# Patient Record
Sex: FEMALE | Race: BLACK OR AFRICAN AMERICAN | NOT HISPANIC OR LATINO | URBAN - METROPOLITAN AREA
[De-identification: names, ages, dates, MRNs, and addresses within clinical notes are randomized per-mention and may not be internally consistent; named-entity substitution may affect disease eponyms.]

---

## 2017-11-05 ENCOUNTER — EMERGENCY (EMERGENCY)
Facility: HOSPITAL | Age: 22
LOS: 1 days | Discharge: HOME | End: 2017-11-05
Attending: STUDENT IN AN ORGANIZED HEALTH CARE EDUCATION/TRAINING PROGRAM
Payer: COMMERCIAL

## 2017-11-05 VITALS
DIASTOLIC BLOOD PRESSURE: 64 MMHG | TEMPERATURE: 98 F | OXYGEN SATURATION: 98 % | WEIGHT: 145.06 LBS | SYSTOLIC BLOOD PRESSURE: 91 MMHG | HEIGHT: 67 IN | HEART RATE: 81 BPM

## 2017-11-05 PROCEDURE — 99284 EMERGENCY DEPT VISIT MOD MDM: CPT | Mod: 25

## 2017-11-05 NOTE — ED PROVIDER NOTE - OBJECTIVE STATEMENT
23 y/o female presents to the ED with c/o urinary symptoms. Pt reports urinary frequency, urgency, and pelvic pain. Pt denies fever, chills, and any other acute symptoms and complaints at this time.

## 2017-11-05 NOTE — ED ADULT TRIAGE NOTE - CHIEF COMPLAINT QUOTE
c/o pelvic and genitalia pain, frequent urination increased discharge,  unprotected intercourse about a month ago

## 2017-11-06 LAB
APPEARANCE UR: CLEAR — SIGNIFICANT CHANGE UP
BILIRUB UR-MCNC: NEGATIVE — SIGNIFICANT CHANGE UP
COLOR SPEC: YELLOW — SIGNIFICANT CHANGE UP
DIFF PNL FLD: ABNORMAL
EPI CELLS # UR: SIGNIFICANT CHANGE UP
GLUCOSE UR QL: NEGATIVE MG/DL — SIGNIFICANT CHANGE UP
HCG UR QL: NEGATIVE — SIGNIFICANT CHANGE UP
KETONES UR-MCNC: NEGATIVE — SIGNIFICANT CHANGE UP
LEUKOCYTE ESTERASE UR-ACNC: ABNORMAL
NITRITE UR-MCNC: NEGATIVE — SIGNIFICANT CHANGE UP
PH UR: 6.5 — SIGNIFICANT CHANGE UP (ref 5–8)
PROT UR-MCNC: NEGATIVE MG/DL — SIGNIFICANT CHANGE UP
RBC CASTS # UR COMP ASSIST: SIGNIFICANT CHANGE UP /HPF (ref 0–4)
SP GR SPEC: 1.01 — SIGNIFICANT CHANGE UP (ref 1.01–1.02)
UROBILINOGEN FLD QL: NEGATIVE MG/DL — SIGNIFICANT CHANGE UP
WBC UR QL: SIGNIFICANT CHANGE UP

## 2017-11-06 PROCEDURE — 99283 EMERGENCY DEPT VISIT LOW MDM: CPT

## 2017-11-06 PROCEDURE — 81001 URINALYSIS AUTO W/SCOPE: CPT

## 2017-11-06 PROCEDURE — 81025 URINE PREGNANCY TEST: CPT

## 2017-11-06 RX ORDER — PHENAZOPYRIDINE HCL 100 MG
1 TABLET ORAL
Qty: 6 | Refills: 0 | OUTPATIENT
Start: 2017-11-06 | End: 2017-11-08

## 2017-11-06 RX ORDER — NITROFURANTOIN MACROCRYSTAL 50 MG
1 CAPSULE ORAL
Qty: 20 | Refills: 0 | OUTPATIENT
Start: 2017-11-06 | End: 2017-11-16

## 2017-11-06 RX ORDER — NITROFURANTOIN MACROCRYSTAL 50 MG
100 CAPSULE ORAL ONCE
Qty: 0 | Refills: 0 | Status: COMPLETED | OUTPATIENT
Start: 2017-11-06 | End: 2017-11-06

## 2017-11-06 RX ORDER — METHENAMINE MANDELATE 1 G
1 TABLET ORAL
Qty: 20 | Refills: 0 | OUTPATIENT
Start: 2017-11-06 | End: 2017-11-16

## 2017-11-06 RX ADMIN — Medication 100 MILLIGRAM(S): at 01:04

## 2017-11-06 NOTE — ED ADULT NURSE NOTE - OBJECTIVE STATEMENT
patient states that she has discomfort in her pelvic region x a few weeks, states that when she urinates she has discomfort. patient denies nausea or vomiting or any radiation of pain

## 2017-11-29 ENCOUNTER — INPATIENT (INPATIENT)
Facility: HOSPITAL | Age: 22
LOS: 1 days | Discharge: HOME | DRG: 761 | End: 2017-12-01
Attending: STUDENT IN AN ORGANIZED HEALTH CARE EDUCATION/TRAINING PROGRAM | Admitting: STUDENT IN AN ORGANIZED HEALTH CARE EDUCATION/TRAINING PROGRAM
Payer: COMMERCIAL

## 2017-11-29 VITALS
WEIGHT: 145.06 LBS | HEART RATE: 72 BPM | TEMPERATURE: 98 F | SYSTOLIC BLOOD PRESSURE: 112 MMHG | RESPIRATION RATE: 18 BRPM | DIASTOLIC BLOOD PRESSURE: 69 MMHG | HEIGHT: 67 IN | OXYGEN SATURATION: 98 %

## 2017-11-29 DIAGNOSIS — R10.9 UNSPECIFIED ABDOMINAL PAIN: ICD-10-CM

## 2017-11-29 LAB
ALBUMIN SERPL ELPH-MCNC: 4.4 G/DL — SIGNIFICANT CHANGE UP (ref 3.3–5.2)
ALP SERPL-CCNC: 87 U/L — SIGNIFICANT CHANGE UP (ref 40–120)
ALT FLD-CCNC: 11 U/L — SIGNIFICANT CHANGE UP
ANION GAP SERPL CALC-SCNC: 13 MMOL/L — SIGNIFICANT CHANGE UP (ref 5–17)
APPEARANCE UR: ABNORMAL
APTT BLD: 31.4 SEC — SIGNIFICANT CHANGE UP (ref 27.5–37.4)
AST SERPL-CCNC: 23 U/L — SIGNIFICANT CHANGE UP
BACTERIA # UR AUTO: ABNORMAL
BASOPHILS # BLD AUTO: 0 K/UL — SIGNIFICANT CHANGE UP (ref 0–0.2)
BASOPHILS # BLD AUTO: 0 K/UL — SIGNIFICANT CHANGE UP (ref 0–0.2)
BASOPHILS NFR BLD AUTO: 0.1 % — SIGNIFICANT CHANGE UP (ref 0–2)
BILIRUB SERPL-MCNC: 0.5 MG/DL — SIGNIFICANT CHANGE UP (ref 0.4–2)
BILIRUB UR-MCNC: NEGATIVE — SIGNIFICANT CHANGE UP
BLD GP AB SCN SERPL QL: SIGNIFICANT CHANGE UP
BUN SERPL-MCNC: 6 MG/DL — LOW (ref 8–20)
CALCIUM SERPL-MCNC: 9.4 MG/DL — SIGNIFICANT CHANGE UP (ref 8.6–10.2)
CHLORIDE SERPL-SCNC: 103 MMOL/L — SIGNIFICANT CHANGE UP (ref 98–107)
CO2 SERPL-SCNC: 23 MMOL/L — SIGNIFICANT CHANGE UP (ref 22–29)
COLOR SPEC: YELLOW — SIGNIFICANT CHANGE UP
CREAT SERPL-MCNC: 0.55 MG/DL — SIGNIFICANT CHANGE UP (ref 0.5–1.3)
DIFF PNL FLD: ABNORMAL
EOSINOPHIL # BLD AUTO: 0 K/UL — SIGNIFICANT CHANGE UP (ref 0–0.5)
EOSINOPHIL # BLD AUTO: 0 K/UL — SIGNIFICANT CHANGE UP (ref 0–0.5)
EOSINOPHIL NFR BLD AUTO: 0.1 % — SIGNIFICANT CHANGE UP (ref 0–6)
EPI CELLS # UR: SIGNIFICANT CHANGE UP
GLUCOSE SERPL-MCNC: 100 MG/DL — SIGNIFICANT CHANGE UP (ref 70–115)
GLUCOSE UR QL: NEGATIVE MG/DL — SIGNIFICANT CHANGE UP
HCG SERPL-ACNC: <2 MIU/ML — SIGNIFICANT CHANGE UP
HCT VFR BLD CALC: 35.9 % — LOW (ref 37–47)
HCT VFR BLD CALC: 37.6 % — SIGNIFICANT CHANGE UP (ref 37–47)
HGB BLD-MCNC: 11.8 G/DL — LOW (ref 12–16)
HGB BLD-MCNC: 12.8 G/DL — SIGNIFICANT CHANGE UP (ref 12–16)
INR BLD: 1.12 RATIO — SIGNIFICANT CHANGE UP (ref 0.88–1.16)
KETONES UR-MCNC: ABNORMAL
LACTATE SERPL-SCNC: 0.8 MMOL/L — SIGNIFICANT CHANGE UP (ref 0.5–2)
LEUKOCYTE ESTERASE UR-ACNC: ABNORMAL
LIDOCAIN IGE QN: 24 U/L — SIGNIFICANT CHANGE UP (ref 22–51)
LYMPHOCYTES # BLD AUTO: 0.9 K/UL — LOW (ref 1–4.8)
LYMPHOCYTES # BLD AUTO: 1.6 K/UL — SIGNIFICANT CHANGE UP (ref 1–4.8)
LYMPHOCYTES # BLD AUTO: 13 % — LOW (ref 20–55)
LYMPHOCYTES # BLD AUTO: 8 % — LOW (ref 20–55)
MCHC RBC-ENTMCNC: 28.9 PG — SIGNIFICANT CHANGE UP (ref 27–31)
MCHC RBC-ENTMCNC: 29.8 PG — SIGNIFICANT CHANGE UP (ref 27–31)
MCHC RBC-ENTMCNC: 32.9 G/DL — SIGNIFICANT CHANGE UP (ref 32–36)
MCHC RBC-ENTMCNC: 34 G/DL — SIGNIFICANT CHANGE UP (ref 32–36)
MCV RBC AUTO: 87.6 FL — SIGNIFICANT CHANGE UP (ref 81–99)
MCV RBC AUTO: 88 FL — SIGNIFICANT CHANGE UP (ref 81–99)
MONOCYTES # BLD AUTO: 0.2 K/UL — SIGNIFICANT CHANGE UP (ref 0–0.8)
MONOCYTES # BLD AUTO: 0.7 K/UL — SIGNIFICANT CHANGE UP (ref 0–0.8)
MONOCYTES NFR BLD AUTO: 2 % — LOW (ref 3–10)
MONOCYTES NFR BLD AUTO: 5.9 % — SIGNIFICANT CHANGE UP (ref 3–10)
NEUTROPHILS # BLD AUTO: 10 K/UL — HIGH (ref 1.8–8)
NEUTROPHILS # BLD AUTO: 10.4 K/UL — HIGH (ref 1.8–8)
NEUTROPHILS NFR BLD AUTO: 80.8 % — HIGH (ref 37–73)
NEUTROPHILS NFR BLD AUTO: 88 % — HIGH (ref 37–73)
NEUTS BAND # BLD: 2 % — SIGNIFICANT CHANGE UP (ref 0–8)
NITRITE UR-MCNC: NEGATIVE — SIGNIFICANT CHANGE UP
PH UR: 6 — SIGNIFICANT CHANGE UP (ref 5–8)
PLAT MORPH BLD: NORMAL — SIGNIFICANT CHANGE UP
PLATELET # BLD AUTO: 305 K/UL — SIGNIFICANT CHANGE UP (ref 150–400)
PLATELET # BLD AUTO: 312 K/UL — SIGNIFICANT CHANGE UP (ref 150–400)
POTASSIUM SERPL-MCNC: 4.3 MMOL/L — SIGNIFICANT CHANGE UP (ref 3.5–5.3)
POTASSIUM SERPL-SCNC: 4.3 MMOL/L — SIGNIFICANT CHANGE UP (ref 3.5–5.3)
PROT SERPL-MCNC: 7.6 G/DL — SIGNIFICANT CHANGE UP (ref 6.6–8.7)
PROT UR-MCNC: 15 MG/DL
PROTHROM AB SERPL-ACNC: 12.3 SEC — SIGNIFICANT CHANGE UP (ref 9.8–12.7)
RBC # BLD: 4.08 M/UL — LOW (ref 4.4–5.2)
RBC # BLD: 4.29 M/UL — LOW (ref 4.4–5.2)
RBC # FLD: 12.8 % — SIGNIFICANT CHANGE UP (ref 11–15.6)
RBC # FLD: 13.4 % — SIGNIFICANT CHANGE UP (ref 11–15.6)
RBC BLD AUTO: NORMAL — SIGNIFICANT CHANGE UP
RBC CASTS # UR COMP ASSIST: ABNORMAL /HPF (ref 0–4)
SODIUM SERPL-SCNC: 139 MMOL/L — SIGNIFICANT CHANGE UP (ref 135–145)
SP GR SPEC: 1.01 — SIGNIFICANT CHANGE UP (ref 1.01–1.02)
TYPE + AB SCN PNL BLD: SIGNIFICANT CHANGE UP
UROBILINOGEN FLD QL: NEGATIVE MG/DL — SIGNIFICANT CHANGE UP
WBC # BLD: 11.5 K/UL — HIGH (ref 4.8–10.8)
WBC # BLD: 12.4 K/UL — HIGH (ref 4.8–10.8)
WBC # FLD AUTO: 11.5 K/UL — HIGH (ref 4.8–10.8)
WBC # FLD AUTO: 12.4 K/UL — HIGH (ref 4.8–10.8)
WBC UR QL: ABNORMAL

## 2017-11-29 PROCEDURE — 99223 1ST HOSP IP/OBS HIGH 75: CPT

## 2017-11-29 PROCEDURE — 74177 CT ABD & PELVIS W/CONTRAST: CPT | Mod: 26

## 2017-11-29 PROCEDURE — 76705 ECHO EXAM OF ABDOMEN: CPT | Mod: 26

## 2017-11-29 PROCEDURE — 76856 US EXAM PELVIC COMPLETE: CPT | Mod: 26

## 2017-11-29 PROCEDURE — 99285 EMERGENCY DEPT VISIT HI MDM: CPT | Mod: 25

## 2017-11-29 RX ORDER — SODIUM CHLORIDE 9 MG/ML
1000 INJECTION, SOLUTION INTRAVENOUS
Qty: 0 | Refills: 0 | Status: DISCONTINUED | OUTPATIENT
Start: 2017-11-29 | End: 2017-11-30

## 2017-11-29 RX ORDER — PANTOPRAZOLE SODIUM 20 MG/1
40 TABLET, DELAYED RELEASE ORAL ONCE
Qty: 0 | Refills: 0 | Status: COMPLETED | OUTPATIENT
Start: 2017-11-29 | End: 2017-11-29

## 2017-11-29 RX ORDER — SODIUM CHLORIDE 9 MG/ML
1000 INJECTION INTRAMUSCULAR; INTRAVENOUS; SUBCUTANEOUS
Qty: 0 | Refills: 0 | Status: DISCONTINUED | OUTPATIENT
Start: 2017-11-29 | End: 2017-11-30

## 2017-11-29 RX ORDER — SODIUM CHLORIDE 9 MG/ML
1000 INJECTION INTRAMUSCULAR; INTRAVENOUS; SUBCUTANEOUS ONCE
Qty: 0 | Refills: 0 | Status: COMPLETED | OUTPATIENT
Start: 2017-11-29 | End: 2017-11-29

## 2017-11-29 RX ORDER — METOCLOPRAMIDE HCL 10 MG
10 TABLET ORAL ONCE
Qty: 0 | Refills: 0 | Status: COMPLETED | OUTPATIENT
Start: 2017-11-29 | End: 2017-11-29

## 2017-11-29 RX ORDER — ONDANSETRON 8 MG/1
4 TABLET, FILM COATED ORAL ONCE
Qty: 0 | Refills: 0 | Status: COMPLETED | OUTPATIENT
Start: 2017-11-29 | End: 2017-11-29

## 2017-11-29 RX ORDER — FAMOTIDINE 10 MG/ML
20 INJECTION INTRAVENOUS ONCE
Qty: 0 | Refills: 0 | Status: COMPLETED | OUTPATIENT
Start: 2017-11-29 | End: 2017-11-29

## 2017-11-29 RX ORDER — DIPHENHYDRAMINE HCL 50 MG
25 CAPSULE ORAL ONCE
Qty: 0 | Refills: 0 | Status: COMPLETED | OUTPATIENT
Start: 2017-11-29 | End: 2017-11-29

## 2017-11-29 RX ORDER — SODIUM CHLORIDE 9 MG/ML
3 INJECTION INTRAMUSCULAR; INTRAVENOUS; SUBCUTANEOUS ONCE
Qty: 0 | Refills: 0 | Status: COMPLETED | OUTPATIENT
Start: 2017-11-29 | End: 2017-11-29

## 2017-11-29 RX ADMIN — FAMOTIDINE 20 MILLIGRAM(S): 10 INJECTION INTRAVENOUS at 07:50

## 2017-11-29 RX ADMIN — PANTOPRAZOLE SODIUM 40 MILLIGRAM(S): 20 TABLET, DELAYED RELEASE ORAL at 11:20

## 2017-11-29 RX ADMIN — SODIUM CHLORIDE 1000 MILLILITER(S): 9 INJECTION INTRAMUSCULAR; INTRAVENOUS; SUBCUTANEOUS at 07:49

## 2017-11-29 RX ADMIN — ONDANSETRON 4 MILLIGRAM(S): 8 TABLET, FILM COATED ORAL at 07:50

## 2017-11-29 RX ADMIN — SODIUM CHLORIDE 3 MILLILITER(S): 9 INJECTION INTRAMUSCULAR; INTRAVENOUS; SUBCUTANEOUS at 07:55

## 2017-11-29 RX ADMIN — Medication 25 MILLIGRAM(S): at 11:20

## 2017-11-29 RX ADMIN — Medication 10 MILLIGRAM(S): at 11:20

## 2017-11-29 RX ADMIN — SODIUM CHLORIDE 125 MILLILITER(S): 9 INJECTION INTRAMUSCULAR; INTRAVENOUS; SUBCUTANEOUS at 07:50

## 2017-11-29 RX ADMIN — SODIUM CHLORIDE 65 MILLILITER(S): 9 INJECTION, SOLUTION INTRAVENOUS at 16:35

## 2017-11-29 NOTE — H&P ADULT - ASSESSMENT
26 yo F with some SX consistent with early appendicitis which is also possible on CT.  However also noted to have corpus leuteal cyst which may be the etiology of pain given absence of fever and only mildly elevated WBC.  Can not RO Acute Appendicitis at this time.    1. Admit to surgery service  2. No Abx  3. No analgesics  4. NPO  5. Serial abd exams  6. Pelvic US to better eval ovaries and RO Ovarian torsion although very unlikely given gradual onset of pain.

## 2017-11-29 NOTE — ED ADULT NURSE REASSESSMENT NOTE - NS ED NURSE REASSESS COMMENT FT1
Report given and pt endorsed to receiving LISSETH HAYDEN at change of shift for follow up and continuity of care.

## 2017-11-29 NOTE — H&P ADULT - HISTORY OF PRESENT ILLNESS
24 yo F began having epigastric pain multiple episodes of NBNB vomiting from midnight last night.  Was already awake at that time.  Pain gradually became more severe throughout night which caused her to present.  + Annorrhexia.  Denies fever, chills, dysuria, vag bleeding or DC, similar SX in past or other CO.  Denies PMHX or P SX hx.  No recent travel or sick contacts or contacts with similar CO.  Can not identify exacerbating or remitting factors.

## 2017-11-29 NOTE — ED PROVIDER NOTE - OBJECTIVE STATEMENT
21 y/o female presents to the ED with c/o abdominal pain, onset last night. Pt reports n/v. Per EMS, pt had multiple syncopal events. Pt denies diarrhea, and any other acute symptoms and complaints at this time.   NKDA

## 2017-11-29 NOTE — ED ADULT NURSE REASSESSMENT NOTE - NS ED NURSE REASSESS COMMENT FT1
Bedside report recvd from off going RN, pt recvd lying on stretcher, A&Ox3, LR running via 20G R-AC at 65ml/hr as ordered, line patent and intact, pt tolerating well, pt reports dull abdominal pain, POC discussed with pt, awaiting bed assignment, call to be made to admitting team for further orders.  Pt verbalized understanding safety measures maintained.

## 2017-11-29 NOTE — H&P ADULT - GASTROINTESTINAL DETAILS
soft/no guarding/+ Mild-moderate periumbilical and BL Lower quadrant tenderness./no distention/no rebound tenderness/no rigidity/no organomegaly/bowel sounds normal

## 2017-11-29 NOTE — H&P ADULT - NSHPLABSRESULTS_GEN_ALL_CORE
Already had RUQ US which was read as WNL which prompted CT of Abd showing 6mm appendix with fluid filled tip.  No inflammatory changes.  + Subcentimeter nodes.  + Corpus leuteal cyst.

## 2017-11-29 NOTE — ED ADULT NURSE REASSESSMENT NOTE - NS ED NURSE REASSESS COMMENT FT1
s/w surgery order for repeat CBC, no analgesics at this time, explanation given to pt and family who verbalize understanding.  Order initiated.

## 2017-11-29 NOTE — ED ADULT NURSE NOTE - OBJECTIVE STATEMENT
22 year old female in no acute distress, alert and oriented x 4, BIBA for N/V and epigastric pain onset at midnight this morning. Pt denies diarrhea, denies fever, denies sick contacts. Pt medicated and labs drawn as ordered, fall precautions in place, will monitor. Grandmother in room with patient.

## 2017-11-29 NOTE — H&P ADULT - ATTENDING COMMENTS
avss  +n/v 1 day with general abd pain, non localized.  abd: soft mild ttp, epigastrium, non peritoneal.    plan  u/s pelvis  will admit for serial abd exams to see if evolves or resolves.  no abx, no pain meds .

## 2017-11-29 NOTE — ED ADULT NURSE REASSESSMENT NOTE - NS ED NURSE REASSESS COMMENT FT1
Pt reports she did void but forgot a sample was needed. Pt requesting pain medication and reports nausea. Pt with no vomiting visualized since arrival. Will advise Dr. Rueda.

## 2017-11-29 NOTE — ED ADULT NURSE NOTE - GENITOURINARY WDL
Bladder non-tender and non-distended. Urine not visualized. Pt with sterile cup and wipes verbalized understanding of clean catch procedure.

## 2017-11-30 LAB
ANION GAP SERPL CALC-SCNC: 12 MMOL/L — SIGNIFICANT CHANGE UP (ref 5–17)
BASOPHILS # BLD AUTO: 0 K/UL — SIGNIFICANT CHANGE UP (ref 0–0.2)
BASOPHILS NFR BLD AUTO: 0.2 % — SIGNIFICANT CHANGE UP (ref 0–2)
BUN SERPL-MCNC: 6 MG/DL — LOW (ref 8–20)
CALCIUM SERPL-MCNC: 8.3 MG/DL — LOW (ref 8.6–10.2)
CHLORIDE SERPL-SCNC: 104 MMOL/L — SIGNIFICANT CHANGE UP (ref 98–107)
CO2 SERPL-SCNC: 20 MMOL/L — LOW (ref 22–29)
CREAT SERPL-MCNC: 0.54 MG/DL — SIGNIFICANT CHANGE UP (ref 0.5–1.3)
EOSINOPHIL # BLD AUTO: 0.1 K/UL — SIGNIFICANT CHANGE UP (ref 0–0.5)
EOSINOPHIL NFR BLD AUTO: 0.6 % — SIGNIFICANT CHANGE UP (ref 0–6)
GLUCOSE BLDC GLUCOMTR-MCNC: 52 MG/DL — LOW (ref 70–99)
GLUCOSE SERPL-MCNC: 63 MG/DL — LOW (ref 70–115)
HCT VFR BLD CALC: 34.5 % — LOW (ref 37–47)
HGB BLD-MCNC: 11.6 G/DL — LOW (ref 12–16)
LYMPHOCYTES # BLD AUTO: 2.6 K/UL — SIGNIFICANT CHANGE UP (ref 1–4.8)
LYMPHOCYTES # BLD AUTO: 24.7 % — SIGNIFICANT CHANGE UP (ref 20–55)
MAGNESIUM SERPL-MCNC: 1.6 MG/DL — SIGNIFICANT CHANGE UP (ref 1.6–2.6)
MCHC RBC-ENTMCNC: 29.5 PG — SIGNIFICANT CHANGE UP (ref 27–31)
MCHC RBC-ENTMCNC: 33.6 G/DL — SIGNIFICANT CHANGE UP (ref 32–36)
MCV RBC AUTO: 87.8 FL — SIGNIFICANT CHANGE UP (ref 81–99)
MONOCYTES # BLD AUTO: 0.8 K/UL — SIGNIFICANT CHANGE UP (ref 0–0.8)
MONOCYTES NFR BLD AUTO: 7.4 % — SIGNIFICANT CHANGE UP (ref 3–10)
NEUTROPHILS # BLD AUTO: 7 K/UL — SIGNIFICANT CHANGE UP (ref 1.8–8)
NEUTROPHILS NFR BLD AUTO: 66.9 % — SIGNIFICANT CHANGE UP (ref 37–73)
PHOSPHATE SERPL-MCNC: 3 MG/DL — SIGNIFICANT CHANGE UP (ref 2.4–4.7)
PLATELET # BLD AUTO: 273 K/UL — SIGNIFICANT CHANGE UP (ref 150–400)
POTASSIUM SERPL-MCNC: 3.6 MMOL/L — SIGNIFICANT CHANGE UP (ref 3.5–5.3)
POTASSIUM SERPL-SCNC: 3.6 MMOL/L — SIGNIFICANT CHANGE UP (ref 3.5–5.3)
RBC # BLD: 3.93 M/UL — LOW (ref 4.4–5.2)
RBC # FLD: 13.3 % — SIGNIFICANT CHANGE UP (ref 11–15.6)
SODIUM SERPL-SCNC: 136 MMOL/L — SIGNIFICANT CHANGE UP (ref 135–145)
WBC # BLD: 10.5 K/UL — SIGNIFICANT CHANGE UP (ref 4.8–10.8)
WBC # FLD AUTO: 10.5 K/UL — SIGNIFICANT CHANGE UP (ref 4.8–10.8)

## 2017-11-30 PROCEDURE — 99233 SBSQ HOSP IP/OBS HIGH 50: CPT

## 2017-11-30 RX ORDER — POTASSIUM CHLORIDE 20 MEQ
10 PACKET (EA) ORAL ONCE
Qty: 0 | Refills: 0 | Status: COMPLETED | OUTPATIENT
Start: 2017-11-30 | End: 2017-11-30

## 2017-11-30 RX ORDER — MAGNESIUM SULFATE 500 MG/ML
2 VIAL (ML) INJECTION ONCE
Qty: 0 | Refills: 0 | Status: DISCONTINUED | OUTPATIENT
Start: 2017-11-30 | End: 2017-11-30

## 2017-11-30 RX ORDER — POTASSIUM CHLORIDE 20 MEQ
20 PACKET (EA) ORAL ONCE
Qty: 0 | Refills: 0 | Status: DISCONTINUED | OUTPATIENT
Start: 2017-11-30 | End: 2017-11-30

## 2017-11-30 RX ORDER — INFLUENZA VIRUS VACCINE 15; 15; 15; 15 UG/.5ML; UG/.5ML; UG/.5ML; UG/.5ML
0.5 SUSPENSION INTRAMUSCULAR ONCE
Qty: 0 | Refills: 0 | Status: DISCONTINUED | OUTPATIENT
Start: 2017-11-30 | End: 2017-12-01

## 2017-11-30 RX ORDER — MAGNESIUM SULFATE 500 MG/ML
2 VIAL (ML) INJECTION ONCE
Qty: 0 | Refills: 0 | Status: COMPLETED | OUTPATIENT
Start: 2017-11-30 | End: 2017-11-30

## 2017-11-30 RX ADMIN — SODIUM CHLORIDE 65 MILLILITER(S): 9 INJECTION, SOLUTION INTRAVENOUS at 03:19

## 2017-11-30 RX ADMIN — Medication 50 GRAM(S): at 16:00

## 2017-11-30 RX ADMIN — Medication 100 MILLIEQUIVALENT(S): at 17:38

## 2017-11-30 NOTE — ED ADULT NURSE REASSESSMENT NOTE - NS ED NURSE REASSESS COMMENT FT1
Report given to accepting RN, pt informed of transport to unit, verbalized understanding, awaiting transport team member.

## 2017-11-30 NOTE — PROGRESS NOTE PEDS - ASSESSMENT
26 yo F with likely corpus leuteal cyst  in the presence of bilateral TTP absence of fever, only mildly elevated WBC, and improving clinically.  -Continue with no Abx  -Advance diet to Regular diet, f/u with toleration of diet  -Repeat Fingerstick with hypoglycemic episode.  -D/c IVF  -Will likely d/c 12/1

## 2017-11-30 NOTE — PROGRESS NOTE PEDS - SUBJECTIVE AND OBJECTIVE BOX
INTERVAL HPI/OVERNIGHT EVENTS: Patient with episode of hypoglycemia.     SUBJECTIVE: Improved pain today, rated 5/10 and located in the inferior abdomen bilaterally, improve from 10/10 yesterday. Patient NPO with IVF. One episode of dizziness when she got up to go to the bathroom, laid down on the floor. Finger stick 52, and given juice. Denies +Urination, denies flatus, denies BM. Denies F/C/N/V/CP/SOB.       MEDICATIONS  (STANDING):  influenza   Vaccine 0.5 milliLiter(s) IntraMuscular once  magnesium sulfate  IVPB 2 Gram(s) IV Intermittent once  potassium chloride  10 mEq/100 mL IVPB 10 milliEquivalent(s) IV Intermittent once    MEDICATIONS  (PRN):      Vital Signs Last 24 Hrs  T(C): 37 (2017 07:57), Max: 37.3 (2017 02:48)  T(F): 98.6 (2017 07:57), Max: 99.1 (2017 02:48)  HR: 77 (2017 07:57) (63 - 88)  BP: 99/62 (2017 07:57) (96/65 - 105/74)  BP(mean): --  RR: 16 (2017 07:57) (16 - 20)  SpO2: 100% (2017 07:57) (95% - 100%)    PE  Gen: AAO x3, NAD  Pulm: CTAB, Symmetrical chest rise  CV: RRR  Abd: Soft, Mild RLQ, LLQ tenderness to palpation, ND, -R/-G  Ext: No C/C/E  Vasc: 2+ Radial, DP pulses  Neuro: No focal neurological deficits      I&O's Detail    2017 07:01  -  2017 07:00  --------------------------------------------------------  IN:    lactated ringers.: 260 mL  Total IN: 260 mL    OUT:  Total OUT: 0 mL    Total NET: 260 mL          LABS:                        11.6   10.5  )-----------( 273      ( 2017 07:44 )             34.5     11-30    136  |  104  |  6.0<L>  ----------------------------<  63<L>  3.6   |  20.0<L>  |  0.54    Ca    8.3<L>      2017 07:44  Phos  3.0       Mg     1.6         TPro  7.6  /  Alb  4.4  /  TBili  0.5  /  DBili  x   /  AST  23  /  ALT  11  /  AlkPhos  87      PT/INR - ( 2017 14:50 )   PT: 12.3 sec;   INR: 1.12 ratio         PTT - ( 2017 14:50 )  PTT:31.4 sec  Urinalysis Basic - ( 2017 18:40 )    Color: Yellow / Appearance: Slightly Turbid / S.015 / pH: x  Gluc: x / Ketone: Moderate  / Bili: Negative / Urobili: Negative mg/dL   Blood: x / Protein: 15 mg/dL / Nitrite: Negative   Leuk Esterase: Moderate / RBC: 3-5 /HPF / WBC 6-10   Sq Epi: x / Non Sq Epi: Few / Bacteria: Few

## 2017-12-01 ENCOUNTER — TRANSCRIPTION ENCOUNTER (OUTPATIENT)
Age: 22
End: 2017-12-01

## 2017-12-01 VITALS
OXYGEN SATURATION: 100 % | RESPIRATION RATE: 16 BRPM | HEART RATE: 72 BPM | SYSTOLIC BLOOD PRESSURE: 108 MMHG | TEMPERATURE: 99 F | DIASTOLIC BLOOD PRESSURE: 70 MMHG

## 2017-12-01 LAB
BASOPHILS # BLD AUTO: 0 K/UL — SIGNIFICANT CHANGE UP (ref 0–0.2)
BASOPHILS NFR BLD AUTO: 0.2 % — SIGNIFICANT CHANGE UP (ref 0–2)
EOSINOPHIL # BLD AUTO: 0.1 K/UL — SIGNIFICANT CHANGE UP (ref 0–0.5)
EOSINOPHIL NFR BLD AUTO: 1.4 % — SIGNIFICANT CHANGE UP (ref 0–6)
HCT VFR BLD CALC: 34.7 % — LOW (ref 37–47)
HGB BLD-MCNC: 11.7 G/DL — LOW (ref 12–16)
LYMPHOCYTES # BLD AUTO: 1.8 K/UL — SIGNIFICANT CHANGE UP (ref 1–4.8)
LYMPHOCYTES # BLD AUTO: 20.9 % — SIGNIFICANT CHANGE UP (ref 20–55)
MCHC RBC-ENTMCNC: 29.3 PG — SIGNIFICANT CHANGE UP (ref 27–31)
MCHC RBC-ENTMCNC: 33.7 G/DL — SIGNIFICANT CHANGE UP (ref 32–36)
MCV RBC AUTO: 87 FL — SIGNIFICANT CHANGE UP (ref 81–99)
MONOCYTES # BLD AUTO: 0.8 K/UL — SIGNIFICANT CHANGE UP (ref 0–0.8)
MONOCYTES NFR BLD AUTO: 9.5 % — SIGNIFICANT CHANGE UP (ref 3–10)
NEUTROPHILS # BLD AUTO: 5.7 K/UL — SIGNIFICANT CHANGE UP (ref 1.8–8)
NEUTROPHILS NFR BLD AUTO: 67.8 % — SIGNIFICANT CHANGE UP (ref 37–73)
PLATELET # BLD AUTO: 299 K/UL — SIGNIFICANT CHANGE UP (ref 150–400)
RBC # BLD: 3.99 M/UL — LOW (ref 4.4–5.2)
RBC # FLD: 13.3 % — SIGNIFICANT CHANGE UP (ref 11–15.6)
WBC # BLD: 8.4 K/UL — SIGNIFICANT CHANGE UP (ref 4.8–10.8)
WBC # FLD AUTO: 8.4 K/UL — SIGNIFICANT CHANGE UP (ref 4.8–10.8)

## 2017-12-01 PROCEDURE — 86900 BLOOD TYPING SEROLOGIC ABO: CPT

## 2017-12-01 PROCEDURE — 83735 ASSAY OF MAGNESIUM: CPT

## 2017-12-01 PROCEDURE — 81001 URINALYSIS AUTO W/SCOPE: CPT

## 2017-12-01 PROCEDURE — 83605 ASSAY OF LACTIC ACID: CPT

## 2017-12-01 PROCEDURE — 76856 US EXAM PELVIC COMPLETE: CPT

## 2017-12-01 PROCEDURE — 99233 SBSQ HOSP IP/OBS HIGH 50: CPT

## 2017-12-01 PROCEDURE — 86850 RBC ANTIBODY SCREEN: CPT

## 2017-12-01 PROCEDURE — 84100 ASSAY OF PHOSPHORUS: CPT

## 2017-12-01 PROCEDURE — 85730 THROMBOPLASTIN TIME PARTIAL: CPT

## 2017-12-01 PROCEDURE — 85027 COMPLETE CBC AUTOMATED: CPT

## 2017-12-01 PROCEDURE — 86901 BLOOD TYPING SEROLOGIC RH(D): CPT

## 2017-12-01 PROCEDURE — 84702 CHORIONIC GONADOTROPIN TEST: CPT

## 2017-12-01 PROCEDURE — 36415 COLL VENOUS BLD VENIPUNCTURE: CPT

## 2017-12-01 PROCEDURE — 80048 BASIC METABOLIC PNL TOTAL CA: CPT

## 2017-12-01 PROCEDURE — 74177 CT ABD & PELVIS W/CONTRAST: CPT

## 2017-12-01 PROCEDURE — 85610 PROTHROMBIN TIME: CPT

## 2017-12-01 PROCEDURE — 99285 EMERGENCY DEPT VISIT HI MDM: CPT | Mod: 25

## 2017-12-01 PROCEDURE — 80053 COMPREHEN METABOLIC PANEL: CPT

## 2017-12-01 PROCEDURE — 96375 TX/PRO/DX INJ NEW DRUG ADDON: CPT

## 2017-12-01 PROCEDURE — 76705 ECHO EXAM OF ABDOMEN: CPT

## 2017-12-01 PROCEDURE — 83690 ASSAY OF LIPASE: CPT

## 2017-12-01 PROCEDURE — 82962 GLUCOSE BLOOD TEST: CPT

## 2017-12-01 PROCEDURE — 96374 THER/PROPH/DIAG INJ IV PUSH: CPT | Mod: XU

## 2017-12-01 NOTE — DISCHARGE NOTE ADULT - CARE PLAN
Principal Discharge DX:	Generalized abdominal pain  Goal:	alleviation of pain and symptoms  Instructions for follow-up, activity and diet:	Resume regular diet as tolerated. Follow up with OB GYN as outpatient regarding possible cyst. Follow up with [facility] clinic on thursday of next week for further follow up.  Return to ER if recurrent symptoms that prevent ability to perform ADLs (activities of daily living). Diet as tolerated. Tylenol/Motrin for pain control.

## 2017-12-01 NOTE — DISCHARGE NOTE ADULT - HOSPITAL COURSE
HPI:  26 yo F began having epigastric pain multiple episodes of NBNB vomiting from midnight last night.  Was already awake at that time.  Pain gradually became more severe throughout night which caused her to present.  + Annorrhexia.  Denies fever, chills, dysuria, vag bleeding or DC, similar SX in past or other CO.  Denies PMHX or P SX hx.  No recent travel or sick contacts or contacts with similar CO.  Can not identify exacerbating or remitting factors. (29 Nov 2017 13:41)    Hospital Course:   Patient was admitted for pain control and diagnostic workup. Etiology of abdominal pain not thought to be due to appendicitis. More likely pain due to ovarian cyst. Patient's diet was advanced on 11/30. Pain is controlled. Patient ambulating. Stable for discharge. Patient should follow up with ACS, OB GYN and Primary care practitioner upon discharge from hospital.

## 2017-12-01 NOTE — DISCHARGE NOTE ADULT - PROVIDER TOKENS
FREE:[LAST:[OB GYN/ PMD],PHONE:[(   )    -],FAX:[(   )    -],ADDRESS:[Follow with OB GYN as outpatient as well as PMD]],FREE:[LAST:[[facility]],PHONE:[phone]],FAX:[(   )    -],ADDRESS:[[address] [place] [address], [address] [place] [address]]]

## 2017-12-01 NOTE — DISCHARGE NOTE ADULT - OTHER SIGNIFICANT FINDINGS
CT Abdomen: Small amount of fluid around tip of appendix. Doubt appendicitis    Abdominal US: Right ovarian cyst

## 2017-12-01 NOTE — DISCHARGE NOTE ADULT - CARE PROVIDER_API CALL
OB GYN/ PMD,   Follow with OB GYN as outpatient as well as PMD  Phone: (   )    -  Fax: (   )    -    [facility],   [address] [facility], [address] [place] [address]  Phone: [phone]  Fax: (   )    -

## 2017-12-01 NOTE — DISCHARGE NOTE ADULT - PATIENT PORTAL LINK FT
“You can access the Follow[employer] Patient Portal, offered by [facility], by registering with the following website: [email or web address]”

## 2018-01-28 ENCOUNTER — EMERGENCY (EMERGENCY)
Facility: HOSPITAL | Age: 23
LOS: 1 days | Discharge: HOME | End: 2018-01-28
Attending: EMERGENCY MEDICINE | Admitting: EMERGENCY MEDICINE
Payer: COMMERCIAL

## 2018-01-28 VITALS
DIASTOLIC BLOOD PRESSURE: 75 MMHG | OXYGEN SATURATION: 98 % | TEMPERATURE: 99 F | WEIGHT: 139.99 LBS | RESPIRATION RATE: 20 BRPM | SYSTOLIC BLOOD PRESSURE: 109 MMHG | HEIGHT: 67 IN | HEART RATE: 120 BPM

## 2018-01-28 VITALS
RESPIRATION RATE: 16 BRPM | SYSTOLIC BLOOD PRESSURE: 101 MMHG | DIASTOLIC BLOOD PRESSURE: 67 MMHG | OXYGEN SATURATION: 100 % | TEMPERATURE: 99 F | HEART RATE: 84 BPM

## 2018-01-28 LAB
RAPID RVP RESULT: SIGNIFICANT CHANGE UP
S PYO AG SPEC QL IA: NEGATIVE — SIGNIFICANT CHANGE UP

## 2018-01-28 PROCEDURE — 81001 URINALYSIS AUTO W/SCOPE: CPT

## 2018-01-28 PROCEDURE — 71046 X-RAY EXAM CHEST 2 VIEWS: CPT

## 2018-01-28 PROCEDURE — 99284 EMERGENCY DEPT VISIT MOD MDM: CPT | Mod: 25

## 2018-01-28 PROCEDURE — 86308 HETEROPHILE ANTIBODY SCREEN: CPT

## 2018-01-28 PROCEDURE — 96374 THER/PROPH/DIAG INJ IV PUSH: CPT

## 2018-01-28 PROCEDURE — 85027 COMPLETE CBC AUTOMATED: CPT

## 2018-01-28 PROCEDURE — 99284 EMERGENCY DEPT VISIT MOD MDM: CPT

## 2018-01-28 PROCEDURE — 87081 CULTURE SCREEN ONLY: CPT

## 2018-01-28 PROCEDURE — 87581 M.PNEUMON DNA AMP PROBE: CPT

## 2018-01-28 PROCEDURE — 87633 RESP VIRUS 12-25 TARGETS: CPT

## 2018-01-28 PROCEDURE — 84702 CHORIONIC GONADOTROPIN TEST: CPT

## 2018-01-28 PROCEDURE — 83690 ASSAY OF LIPASE: CPT

## 2018-01-28 PROCEDURE — 71046 X-RAY EXAM CHEST 2 VIEWS: CPT | Mod: 26

## 2018-01-28 PROCEDURE — 87798 DETECT AGENT NOS DNA AMP: CPT

## 2018-01-28 PROCEDURE — 36415 COLL VENOUS BLD VENIPUNCTURE: CPT

## 2018-01-28 PROCEDURE — 80053 COMPREHEN METABOLIC PANEL: CPT

## 2018-01-28 PROCEDURE — 87486 CHLMYD PNEUM DNA AMP PROBE: CPT

## 2018-01-28 PROCEDURE — 87880 STREP A ASSAY W/OPTIC: CPT

## 2018-01-28 PROCEDURE — 87086 URINE CULTURE/COLONY COUNT: CPT

## 2018-01-28 RX ORDER — CEPHALEXIN 500 MG
10 CAPSULE ORAL
Qty: 200 | Refills: 0 | OUTPATIENT
Start: 2018-01-28 | End: 2018-02-06

## 2018-01-28 RX ORDER — SODIUM CHLORIDE 9 MG/ML
10001 INJECTION INTRAMUSCULAR; INTRAVENOUS; SUBCUTANEOUS ONCE
Qty: 0 | Refills: 0 | Status: DISCONTINUED | OUTPATIENT
Start: 2018-01-28 | End: 2018-01-28

## 2018-01-28 RX ORDER — ONDANSETRON 8 MG/1
4 TABLET, FILM COATED ORAL ONCE
Qty: 0 | Refills: 0 | Status: COMPLETED | OUTPATIENT
Start: 2018-01-28 | End: 2018-01-28

## 2018-01-28 RX ORDER — SODIUM CHLORIDE 9 MG/ML
1000 INJECTION INTRAMUSCULAR; INTRAVENOUS; SUBCUTANEOUS ONCE
Qty: 0 | Refills: 0 | Status: COMPLETED | OUTPATIENT
Start: 2018-01-28 | End: 2018-01-28

## 2018-01-28 RX ORDER — CEPHALEXIN 500 MG
500 CAPSULE ORAL EVERY 6 HOURS
Qty: 0 | Refills: 0 | Status: DISCONTINUED | OUTPATIENT
Start: 2018-01-28 | End: 2018-02-01

## 2018-01-28 RX ORDER — DEXAMETHASONE 0.5 MG/5ML
4 ELIXIR ORAL ONCE
Qty: 0 | Refills: 0 | Status: COMPLETED | OUTPATIENT
Start: 2018-01-28 | End: 2018-01-28

## 2018-01-28 RX ADMIN — SODIUM CHLORIDE 1000 MILLILITER(S): 9 INJECTION INTRAMUSCULAR; INTRAVENOUS; SUBCUTANEOUS at 12:43

## 2018-01-28 RX ADMIN — Medication 4 MILLIGRAM(S): at 12:43

## 2018-01-28 RX ADMIN — Medication 500 MILLIGRAM(S): at 15:13

## 2018-01-28 NOTE — ED STATDOCS - OBJECTIVE STATEMENT
21 y/o pt with no pertinent past medical hx, presents to ED c/o sore throat ( onset yesterday), with a cough. Pt states it is hard for her to swallow. She is also c/o cramping abd pain (onset a few days ago) with nausea. She describes her abd pain as a 7/10. Additionally pt adds that she has dysuria and frequent urination as well. She recently had a Ct and US of the pelvis which showed a small right ovarian cyst. Her last bowl movement was yesterday. Pt is able to pass gas normally. LMP was in late December 2018. Denies recent travel. Denies having children. Denies being a smoker. Denies vomiting, diarrhea, CP and SOB. No further complaints at this time.

## 2018-01-28 NOTE — ED STATDOCS - CARE PLAN
Principal Discharge DX:	Urinary tract infection without hematuria, site unspecified  Secondary Diagnosis:	Pharyngitis, unspecified etiology  Secondary Diagnosis:	Bronchitis

## 2018-01-28 NOTE — ED STATDOCS - PROGRESS NOTE DETAILS
< type="items" fid="[id number]  pt feels much better and will be discharged

## 2018-01-28 NOTE — ED ADULT NURSE NOTE - OBJECTIVE STATEMENT
Assumed pt care at 1200.  Pt awake, alert and oriented x3 c/o upper abd pain, non productive cough and sore throat.  Last Bm yesterday, normal.  LMP end of december.  Pt also states burning upon urination and increased urinary frequency.  denies n/v/d or body aches.

## 2018-01-28 NOTE — ED STATDOCS - MEDICAL DECISION MAKING DETAILS
Pt with persistent cough possible pneumonia vs bronchitis. Abd pain gastritis vs gastroenteritis. Pt had recent ct abd pelvis and us which showed a small right ovarian cyst. will hydrate and give Zofran

## 2018-01-28 NOTE — ED ADULT TRIAGE NOTE - CHIEF COMPLAINT QUOTE
abd pain, no vomit, no diarrhea, sore throat, cough non productive. sinus pressure. abd pain x 2 days.

## 2018-01-30 LAB
CULTURE RESULTS: SIGNIFICANT CHANGE UP
SPECIMEN SOURCE: SIGNIFICANT CHANGE UP

## 2018-01-31 LAB
CULTURE RESULTS: SIGNIFICANT CHANGE UP
SPECIMEN SOURCE: SIGNIFICANT CHANGE UP

## 2018-05-16 ENCOUNTER — EMERGENCY (EMERGENCY)
Facility: HOSPITAL | Age: 23
LOS: 1 days | Discharge: HOME | End: 2018-05-16
Attending: EMERGENCY MEDICINE
Payer: COMMERCIAL

## 2018-05-16 VITALS
HEART RATE: 67 BPM | HEIGHT: 67 IN | OXYGEN SATURATION: 100 % | RESPIRATION RATE: 17 BRPM | DIASTOLIC BLOOD PRESSURE: 81 MMHG | TEMPERATURE: 98 F | WEIGHT: 139.99 LBS | SYSTOLIC BLOOD PRESSURE: 116 MMHG

## 2018-05-16 PROCEDURE — 99283 EMERGENCY DEPT VISIT LOW MDM: CPT

## 2018-05-16 PROCEDURE — 93010 ELECTROCARDIOGRAM REPORT: CPT

## 2018-05-16 PROCEDURE — 71045 X-RAY EXAM CHEST 1 VIEW: CPT

## 2018-05-16 PROCEDURE — 71045 X-RAY EXAM CHEST 1 VIEW: CPT | Mod: 26

## 2018-05-16 PROCEDURE — 93005 ELECTROCARDIOGRAM TRACING: CPT

## 2018-05-16 RX ORDER — IBUPROFEN 200 MG
4 TABLET ORAL
Qty: 112 | Refills: 0 | OUTPATIENT
Start: 2018-05-16 | End: 2018-05-22

## 2018-05-22 NOTE — ED PROVIDER NOTE - OBJECTIVE STATEMENT
21 yo female no pmh on no meds comes to ed with left sided chest and shoulder pain; pt denies any injury, fever chills, sick contacts or recent travel

## 2018-05-22 NOTE — ED PROVIDER NOTE - TOBACCO USE
How Severe Is It?: moderate Is This A New Presentation, Or A Follow-Up?: Rash Additional History: Patient states she was seen by Dr Mar a dermatologist in [place] who states she had staph and impetigo. Patient was treated with Keflex 500 mg four times a day for 7 days and betamethasone dipropionate cream. Patient states that the infection seemed to have cleared up with the antibiotic but the topical only helps with the itching. Never smoker

## 2018-05-29 ENCOUNTER — EMERGENCY (EMERGENCY)
Facility: HOSPITAL | Age: 23
LOS: 1 days | Discharge: HOME | End: 2018-05-29
Attending: EMERGENCY MEDICINE
Payer: COMMERCIAL

## 2018-05-29 VITALS
SYSTOLIC BLOOD PRESSURE: 110 MMHG | RESPIRATION RATE: 20 BRPM | HEART RATE: 89 BPM | TEMPERATURE: 98 F | DIASTOLIC BLOOD PRESSURE: 64 MMHG | OXYGEN SATURATION: 100 %

## 2018-05-29 VITALS — HEIGHT: 67 IN | WEIGHT: 139.99 LBS

## 2018-05-29 LAB — HCG UR QL: NEGATIVE — SIGNIFICANT CHANGE UP

## 2018-05-29 PROCEDURE — 81025 URINE PREGNANCY TEST: CPT

## 2018-05-29 PROCEDURE — 99283 EMERGENCY DEPT VISIT LOW MDM: CPT

## 2018-05-29 RX ORDER — FLUTICASONE PROPIONATE 50 MCG
1 SPRAY, SUSPENSION NASAL
Qty: 1 | Refills: 0 | OUTPATIENT
Start: 2018-05-29 | End: 2018-06-04

## 2018-05-29 RX ORDER — LORATADINE 10 MG/1
1 TABLET ORAL
Qty: 5 | Refills: 0 | OUTPATIENT
Start: 2018-05-29 | End: 2018-06-02

## 2018-05-29 NOTE — ED PROVIDER NOTE - OBJECTIVE STATEMENT
This is a 22 year old female with c/o cough x 1 week, associated with productive green plegm.  She notes has been taking Dayquil This is a 22 year old female with c/o cough x 1 week, associated with productive green phlegm.  She notes has been taking Dayquil with no relief.  She denies any fevers or chills.  Patient any h/o asthma or smoking.  She reports initially started off with sore throat and now subsided.  She denies any recent travel or rashes, SOB, n/v/d or any abdominal pain.

## 2018-05-29 NOTE — ED PROVIDER NOTE - ATTENDING CONTRIBUTION TO CARE
seen with acp: well appearing, NAD; normal oropharynx; clear lungs; benign exam; agree with acp plan of care.    I, Orion Mitchell, performed the initial face to face bedside interview with this patient regarding history of present illness, review of symptoms and relevant past medical, social and family history.  I completed an independent physical examination.  I was the initial provider who evaluated this patient. I have signed out the follow up of any pending tests (i.e. labs, radiological studies) to the ACP.  I have communicated the patient’s plan of care and disposition with the ACP.

## 2018-05-29 NOTE — ED ADULT NURSE NOTE - OBJECTIVE STATEMENT
pt came to the ED for c/o cough with green mucous for one week.  pt also stated that vomited to day once.  pt also c/o sore throat for a week.  pt is A/Ox3.

## 2018-07-09 ENCOUNTER — RESULT REVIEW (OUTPATIENT)
Age: 23
End: 2018-07-09

## 2018-07-13 ENCOUNTER — EMERGENCY (EMERGENCY)
Facility: HOSPITAL | Age: 23
LOS: 1 days | Discharge: HOME | End: 2018-07-13
Attending: EMERGENCY MEDICINE
Payer: COMMERCIAL

## 2018-07-13 VITALS — HEIGHT: 67 IN | WEIGHT: 139.99 LBS

## 2018-07-13 VITALS
SYSTOLIC BLOOD PRESSURE: 100 MMHG | DIASTOLIC BLOOD PRESSURE: 67 MMHG | RESPIRATION RATE: 18 BRPM | HEART RATE: 70 BPM | TEMPERATURE: 97 F | OXYGEN SATURATION: 100 %

## 2018-07-13 PROCEDURE — 73564 X-RAY EXAM KNEE 4 OR MORE: CPT | Mod: 26,LT

## 2018-07-13 PROCEDURE — 99283 EMERGENCY DEPT VISIT LOW MDM: CPT

## 2018-07-13 PROCEDURE — 73564 X-RAY EXAM KNEE 4 OR MORE: CPT

## 2018-07-13 RX ORDER — IBUPROFEN 200 MG
600 TABLET ORAL ONCE
Qty: 0 | Refills: 0 | Status: COMPLETED | OUTPATIENT
Start: 2018-07-13 | End: 2018-07-13

## 2018-07-13 RX ADMIN — Medication 600 MILLIGRAM(S): at 19:53

## 2018-07-13 NOTE — ED PROVIDER NOTE - PHYSICAL EXAMINATION
Const: Awake, alert and oriented. In no acute distress. Well appearing.  HEENT: NC/AT. Moist mucous membranes.  Eyes: No scleral icterus. EOMI.  Neck:. Soft and supple. Full ROM without pain.  Cardiac: Regular rate and regular rhythm. +S1/S2. No murmurs.   Resp: Speaking in full sentences. No evidence of respiratory distress. No wheezes, rales or rhonchi.  Abd: Soft, non-tender, non-distended. Normal bowel sounds in all 4 quadrants. No guarding or rebound.  MSK: Tenderness over the left knee, FULL ROM in all extremities, neurovasculary intact, DP palpable   Back: Spine midline and non-tender. No CVAT.  Skin: No rashes, abrasions or lacerations.  Lymph: No cervical lymphadenopathy.  Neuro: Awake, alert & oriented x 3. Moves all extremities symmetrically.

## 2018-07-13 NOTE — ED PROVIDER NOTE - OBJECTIVE STATEMENT
Patient is a 23 y/o female c/o of left knee pain x 2 days. Patient denies injuries or trauma to the left knee. Patient denies past history of knee pain and surgeries on the knee. Patient denies taking any medications for the pain. Patient denies difficulty ambulating on the knee. Patient denies fevers, abrasions or lacerations, numbness or loss of sensation.

## 2018-07-13 NOTE — ED PROVIDER NOTE - ATTENDING CONTRIBUTION TO CARE
I, Herman Silva, performed a face to face bedside interview with this patient regarding history of present illness, review of symptoms and relevant past medical, social and family history.  I completed an independent physical examination. I have communicated the patient’s plan of care and disposition with the ACP.  22 year year old female presents with 2 days of atraumatic knee pain  Gen: NAD, well appearing  CV: RRR  Pul: CTA b/l  Abd: Soft, non-distended, non-tender  Neuro: no focal deficits  msk: no joint laxity, no effusion, able to bear weight  Pt improved, bearing weight, stable for dc

## 2019-05-16 NOTE — ED ADULT TRIAGE NOTE - BMI (KG/M2)
21.9 Advancement-Rotation Flap Text: The defect edges were debeveled with a #15 scalpel blade.  Given the location of the defect, shape of the defect and the proximity to free margins an advancement-rotation flap was deemed most appropriate.  Using a sterile surgical marker, an appropriate flap was drawn incorporating the defect and placing the expected incisions within the relaxed skin tension lines where possible. The area thus outlined was incised deep to adipose tissue with a #15 scalpel blade.  The skin margins were undermined to an appropriate distance in all directions utilizing iris scissors.

## 2019-12-13 ENCOUNTER — RESULT REVIEW (OUTPATIENT)
Age: 24
End: 2019-12-13

## 2020-09-18 ENCOUNTER — ASOB RESULT (OUTPATIENT)
Age: 25
End: 2020-09-18

## 2020-09-18 ENCOUNTER — APPOINTMENT (OUTPATIENT)
Dept: ANTEPARTUM | Facility: CLINIC | Age: 25
End: 2020-09-18
Payer: COMMERCIAL

## 2020-09-18 PROCEDURE — 76818 FETAL BIOPHYS PROFILE W/NST: CPT

## 2020-09-21 ENCOUNTER — APPOINTMENT (OUTPATIENT)
Dept: ANTEPARTUM | Facility: CLINIC | Age: 25
End: 2020-09-21

## 2020-09-28 ENCOUNTER — OUTPATIENT (OUTPATIENT)
Dept: OUTPATIENT SERVICES | Facility: HOSPITAL | Age: 25
LOS: 1 days | End: 2020-09-28
Payer: COMMERCIAL

## 2020-09-28 VITALS — HEART RATE: 99 BPM | DIASTOLIC BLOOD PRESSURE: 71 MMHG | SYSTOLIC BLOOD PRESSURE: 111 MMHG

## 2020-09-28 VITALS — SYSTOLIC BLOOD PRESSURE: 105 MMHG | DIASTOLIC BLOOD PRESSURE: 72 MMHG | HEART RATE: 86 BPM

## 2020-09-28 DIAGNOSIS — O47.1 FALSE LABOR AT OR AFTER 37 COMPLETED WEEKS OF GESTATION: ICD-10-CM

## 2020-09-28 LAB
APPEARANCE UR: CLEAR — SIGNIFICANT CHANGE UP
BILIRUB UR-MCNC: NEGATIVE — SIGNIFICANT CHANGE UP
COLOR SPEC: YELLOW — SIGNIFICANT CHANGE UP
DIFF PNL FLD: NEGATIVE — SIGNIFICANT CHANGE UP
EPI CELLS # UR: ABNORMAL
GLUCOSE UR QL: NEGATIVE MG/DL — SIGNIFICANT CHANGE UP
KETONES UR-MCNC: NEGATIVE — SIGNIFICANT CHANGE UP
LEUKOCYTE ESTERASE UR-ACNC: ABNORMAL
NITRITE UR-MCNC: NEGATIVE — SIGNIFICANT CHANGE UP
PH UR: 6.5 — SIGNIFICANT CHANGE UP (ref 5–8)
PROT UR-MCNC: 15 MG/DL
RBC CASTS # UR COMP ASSIST: NEGATIVE /HPF — SIGNIFICANT CHANGE UP (ref 0–4)
SP GR SPEC: 1.01 — SIGNIFICANT CHANGE UP (ref 1.01–1.02)
UROBILINOGEN FLD QL: NEGATIVE MG/DL — SIGNIFICANT CHANGE UP
WBC UR QL: SIGNIFICANT CHANGE UP

## 2020-09-28 PROCEDURE — G0463: CPT

## 2020-09-28 PROCEDURE — 81001 URINALYSIS AUTO W/SCOPE: CPT

## 2020-09-28 PROCEDURE — 59025 FETAL NON-STRESS TEST: CPT

## 2020-09-28 RX ORDER — SODIUM CHLORIDE 9 MG/ML
1000 INJECTION, SOLUTION INTRAVENOUS ONCE
Refills: 0 | Status: COMPLETED | OUTPATIENT
Start: 2020-09-28 | End: 2020-09-28

## 2020-09-28 RX ADMIN — SODIUM CHLORIDE 1000 MILLILITER(S): 9 INJECTION, SOLUTION INTRAVENOUS at 20:15

## 2020-09-28 NOTE — OB PROVIDER TRIAGE NOTE - HISTORY OF PRESENT ILLNESS
The pt is a 25 y The pt is a 26 y/o  at 40w2d who presents complaining of lower abdominal cramping for the past hour. She reports the pain was initially intermittent and is now constant. She denies any vaginal bleeding, LOF and reports good fetal movement. She reports that this pregnancy has been uncomplicated. She recieved a majority of her prenatal care in [place], with Dr. Ghada Rangel, and recently moved to [place] to be closer to family after experience maine ramos contractions during her third trimester.     EDC: 2020                  LMP: 2019  POBhx: none  PMHx: none  PSHx: none  meds: PNV  Allergies: NKDA  Social: denies toxic habits x3   The pt is a 26 y/o  at 40w2d who presents complaining of lower abdominal cramping for the past hour. She reports the pain was initially intermittent and is now constant. She denies any vaginal bleeding, LOF and reports good fetal movement. She reports that this pregnancy has been uncomplicated. She recieved a majority of her prenatal care in [place], with Dr. Ghada Rangel, and recently moved to [place] to be closer to family after experiencing maine ramos contractions during her third trimester.     EDC: 2020                  LMP: 2019  POBhx: none  PMHx: none  PSHx: none  meds: PNV  Allergies: NKDA  Social: denies toxic habits x3

## 2020-09-28 NOTE — OB RN TRIAGE NOTE - NS_TRIAGEADDITIONAL COMMENTS_OBGYN_ALL_OB_FT
pt seen  bacilio valenzuela applied pty late to care at 38 weeks here in [place] prior records from [place] unattainable pt presents in  discomfort hunched over unable to walk straight stating  pain and pressure like a uti as stated by pt . pt deniies flank pain and  states  minimal pressure and painon palpation of abdomen urine is cloudy fhr 140  moderate w  acels  urine sent  pt  seen  ve 1-2 cm  continued monitoring. jos chatman 2100  at bedside audible  fhr decel unable to trace consistenly o2 via face mask given pt  turned on her side  abdomen palpated soft fhr returned to basel;ine of 150 ob resident at bedside  ve done no change pt to be continued on monitor attending  notified . jesika chatman pt seen  bacilio valenzuela applied pty late to care at 38 weeks here in [place] prior records from [place] unattainable pt presents in  discomfort hunched over unable to walk straight stating  pain and pressure like a uti as stated by pt . pt deniies flank pain and  states  minimal pressure and painon palpation of abdomen urine is cloudy fhr 140  moderate w  acels  urine sent  pt  seen  ve 1-2 cm  continued  monitoring. jos chatman 2100  at bedside audible  fhr decel unable to trace consistenly o2 via face mask given pt  turned on her side  abdomen palpated soft fhr returned to basel;ine of 150 ob resident at bedside  ve done no change pt to be continued on monitor attending  notified . jesika chatman   2320  ve done by  dr kaiser no  change  pt to go home  fu w cnm in am  fhr strip approved by dr tejada iv out site intact discharge papers given .jos chatman

## 2020-09-28 NOTE — OB PROVIDER TRIAGE NOTE - NSHPPHYSICALEXAM_GEN_ALL_CORE
Vital Signs Last 24 Hrs  HR: 99 (28 Sep 2020 19:50) (99 - 99)  BP: 111/71 (28 Sep 2020 19:50) (111/71 - 111/71)    SVE: 1.5/40/-3  Bedside sono: cephalic presentation    FHT:  [place]:    BPP: Vital Signs Last 24 Hrs  HR: 99 (28 Sep 2020 19:50) (99 - 99)  BP: 111/71 (28 Sep 2020 19:50) (111/71 - 111/71)    SVE: 1.5/40/-3  Bedside sono: cephalic presentation    FHT: 135 baseline, moderate variability, +accels, no decels   [place]: Contractions q2-5 minutes, uterine irritability    BPP:    Urinalysis Basic - ( 28 Sep 2020 20:56 )    Color: Yellow / Appearance: Clear / S.010 / pH: x  Gluc: x / Ketone: Negative  / Bili: Negative / Urobili: Negative mg/dL   Blood: x / Protein: 15 mg/dL / Nitrite: Negative   Leuk Esterase: Small / RBC: Negative /HPF / WBC 0-2   Sq Epi: x / Non Sq Epi: Moderate / Bacteria: x Vital Signs Last 24 Hrs  HR: 99 (28 Sep 2020 19:50) (99 - 99)  BP: 111/71 (28 Sep 2020 19:50) (111/71 - 111/71)    SVE: 1.5/40/-3  Bedside sono: cephalic presentation    FHT: 135 baseline, moderate variability, +accels, no decels   [place]: Contractions q2-5 minutes, uterine irritability    BPP: 8/8    Urinalysis Basic - ( 28 Sep 2020 20:56 )    Color: Yellow / Appearance: Clear / S.010 / pH: x  Gluc: x / Ketone: Negative  / Bili: Negative / Urobili: Negative mg/dL   Blood: x / Protein: 15 mg/dL / Nitrite: Negative   Leuk Esterase: Small / RBC: Negative /HPF / WBC 0-2   Sq Epi: x / Non Sq Epi: Moderate / Bacteria: x

## 2020-09-28 NOTE — OB PROVIDER TRIAGE NOTE - NSOBPROVIDERNOTE_OBGYN_ALL_OB_FT
at 40w2d who presents complaining of lower abdominal cramping for the past hour.   - VSS  - NST  - BPP  - Urinalysis   - Continue to monitor and reevaluate     Discussed with Dr. Lopez  at 40w2d who presents complaining of lower abdominal cramping for the past hour.   - VSS  - NST  - BPP  - Urinalysis- negative  - Continue to monitor and reevaluate     Discussed with Dr. Lopez  at 40w2d who presents complaining of lower abdominal cramping for the past hour.   - VSS  - NST- reactive with 1 decel, will monitor for 2 hour  - BPP:   - Urinalysis- negative  - Continue to monitor and reevaluate     Discussed with Dr. Lopez 26 y/o  at 40w2d who presents with contractions.   - VSS  - NST- reactive with 1 decel, with spontaneous recovery, reactive while monitoring afterwards for 2 hours  - BPP:   - Urinalysis- negative  - No cervical change 2hours after initial exam  - Stable for D/C home and follow-up with ob/gyn, pt has appointment tomorrow.      Dr. Lopez present at bedside and in agreement.

## 2020-09-29 ENCOUNTER — OUTPATIENT (OUTPATIENT)
Dept: OUTPATIENT SERVICES | Facility: HOSPITAL | Age: 25
LOS: 1 days | End: 2020-09-29
Payer: COMMERCIAL

## 2020-09-29 DIAGNOSIS — Z11.59 ENCOUNTER FOR SCREENING FOR OTHER VIRAL DISEASES: ICD-10-CM

## 2020-09-29 PROCEDURE — U0003: CPT

## 2020-09-30 ENCOUNTER — INPATIENT (INPATIENT)
Facility: HOSPITAL | Age: 25
LOS: 2 days | Discharge: HOME | End: 2020-10-03
Attending: OBSTETRICS & GYNECOLOGY | Admitting: OBSTETRICS & GYNECOLOGY
Payer: COMMERCIAL

## 2020-09-30 VITALS — SYSTOLIC BLOOD PRESSURE: 117 MMHG | HEART RATE: 87 BPM | DIASTOLIC BLOOD PRESSURE: 70 MMHG

## 2020-09-30 DIAGNOSIS — O47.1 FALSE LABOR AT OR AFTER 37 COMPLETED WEEKS OF GESTATION: ICD-10-CM

## 2020-09-30 LAB
BASOPHILS # BLD AUTO: 0.04 K/UL — SIGNIFICANT CHANGE UP (ref 0–0.2)
BASOPHILS NFR BLD AUTO: 0.3 % — SIGNIFICANT CHANGE UP (ref 0–2)
BLD GP AB SCN SERPL QL: SIGNIFICANT CHANGE UP
EOSINOPHIL # BLD AUTO: 0.06 K/UL — SIGNIFICANT CHANGE UP (ref 0–0.5)
EOSINOPHIL NFR BLD AUTO: 0.5 % — SIGNIFICANT CHANGE UP (ref 0–6)
HCT VFR BLD CALC: 35.9 % — SIGNIFICANT CHANGE UP (ref 34.5–45)
HGB BLD-MCNC: 11.8 G/DL — SIGNIFICANT CHANGE UP (ref 11.5–15.5)
IMM GRANULOCYTES NFR BLD AUTO: 1.1 % — SIGNIFICANT CHANGE UP (ref 0–1.5)
LYMPHOCYTES # BLD AUTO: 19.7 % — SIGNIFICANT CHANGE UP (ref 13–44)
LYMPHOCYTES # BLD AUTO: 2.29 K/UL — SIGNIFICANT CHANGE UP (ref 1–3.3)
MCHC RBC-ENTMCNC: 29.6 PG — SIGNIFICANT CHANGE UP (ref 27–34)
MCHC RBC-ENTMCNC: 32.9 GM/DL — SIGNIFICANT CHANGE UP (ref 32–36)
MCV RBC AUTO: 90 FL — SIGNIFICANT CHANGE UP (ref 80–100)
MONOCYTES # BLD AUTO: 1.31 K/UL — HIGH (ref 0–0.9)
MONOCYTES NFR BLD AUTO: 11.3 % — SIGNIFICANT CHANGE UP (ref 2–14)
NEUTROPHILS # BLD AUTO: 7.8 K/UL — HIGH (ref 1.8–7.4)
NEUTROPHILS NFR BLD AUTO: 67.1 % — SIGNIFICANT CHANGE UP (ref 43–77)
PLATELET # BLD AUTO: 320 K/UL — SIGNIFICANT CHANGE UP (ref 150–400)
RBC # BLD: 3.99 M/UL — SIGNIFICANT CHANGE UP (ref 3.8–5.2)
RBC # FLD: 13.9 % — SIGNIFICANT CHANGE UP (ref 10.3–14.5)
SARS-COV-2 RNA SPEC QL NAA+PROBE: SIGNIFICANT CHANGE UP
WBC # BLD: 11.63 K/UL — HIGH (ref 3.8–10.5)
WBC # FLD AUTO: 11.63 K/UL — HIGH (ref 3.8–10.5)

## 2020-09-30 RX ORDER — CITRIC ACID/SODIUM CITRATE 300-500 MG
30 SOLUTION, ORAL ORAL ONCE
Refills: 0 | Status: COMPLETED | OUTPATIENT
Start: 2020-09-30 | End: 2020-09-30

## 2020-09-30 RX ORDER — SODIUM CHLORIDE 9 MG/ML
1000 INJECTION, SOLUTION INTRAVENOUS
Refills: 0 | Status: DISCONTINUED | OUTPATIENT
Start: 2020-09-30 | End: 2020-10-01

## 2020-09-30 RX ORDER — OXYTOCIN 10 UNIT/ML
333.33 VIAL (ML) INJECTION
Qty: 20 | Refills: 0 | Status: COMPLETED | OUTPATIENT
Start: 2020-09-30 | End: 2020-09-30

## 2020-09-30 RX ORDER — SODIUM CHLORIDE 9 MG/ML
1000 INJECTION, SOLUTION INTRAVENOUS ONCE
Refills: 0 | Status: COMPLETED | OUTPATIENT
Start: 2020-09-30 | End: 2020-09-30

## 2020-09-30 RX ADMIN — SODIUM CHLORIDE 1000 MILLILITER(S): 9 INJECTION, SOLUTION INTRAVENOUS at 22:39

## 2020-09-30 RX ADMIN — Medication 30 MILLILITER(S): at 22:39

## 2020-09-30 NOTE — OB PROVIDER H&P - HISTORY OF PRESENT ILLNESS
Lucía Dave is a 25 y.o.  at 40 weeks 4 days gestation who presents to L&D for scheduled elective IOL.    PMH: denies  PSH: denies  Meds: PNV  NKDA    GBS neg   Lucía Dave is a 25 y.o.  at 40 weeks 4 days gestation who presents to L&D for scheduled elective IOL. She reports irregular contractions and denies wendie vaginal bleeding or LOF. She reports good fetal movement. She reports this pregnancy has been uncomplicated.    POBhx: none  PMH: denies  PSH: denies  Meds: PNV  NKDA  GBS neg   Lucía Dave is a 25 y.o.  at 40 weeks 4 days gestation who presents to L&D for scheduled elective IOL. She reports irregular contractions and denies wendie vaginal bleeding or LOF. She reports good fetal movement.  She received prenatal in [place], transferred to midwifery practice in third trimester in [place].  Pt states pregnancy has been uncomplicated.    POBhx: none  PMH: denies  PSH: denies  Meds: PNV  NKDA  GBS neg

## 2020-09-30 NOTE — OB PROVIDER H&P - ATTENDING COMMENTS
As above, pt admitted for post EDC induction of labor but found to be having CTXs and cervix 3cm dilated.  Plan for observation/expectant management for now.  Will consider augmentation prn.

## 2020-09-30 NOTE — OB RN DELIVERY SUMMARY - NS_SEPSISRSKCALC_OBGYN_ALL_OB_FT
EOS calculated successfully. EOS Risk Factor: 0.5/1000 live births ([facility] national incidence); GA=40w3d; Temp=99.5; ROM=9.233; GBS='Negative'; Antibiotics='No antibiotics or any antibiotics < 2 hrs prior to birth'

## 2020-09-30 NOTE — OB PROVIDER H&P - GRAVIDA, OB PROFILE
1 [Home] : at home, [unfilled] , at the time of the visit. [Verbal consent obtained from patient] : the patient, [unfilled] [Medical Office: ([facility])___] : at the medical office located in  [FreeTextEntry8] : Pt says that her right wrist is swollen like a balloon. It's red and it's warm. \par Pt says it has been like this since yesterday. Her wrist ached for 2 days before this. \par No fever. She does not recall hitting it anywhere. \par (Pt was supposed to come to office today but was unable to make it)

## 2020-09-30 NOTE — OB PROVIDER H&P - NSHPPHYSICALEXAM_GEN_ALL_CORE
Vital Signs Last 24 Hrs  T(C): 36.7 (30 Sep 2020 19:14), Max: 36.7 (30 Sep 2020 18:56)  T(F): 98.06 (30 Sep 2020 19:14), Max: 98.1 (30 Sep 2020 18:56)  HR: 87 (30 Sep 2020 18:56) (87 - 87)  BP: 117/70 (30 Sep 2020 18:56) (117/70 - 117/70)  RR: 16 (30 Sep 2020 18:56) (16 - 16)    FHT: 140 baseline, moderate variability, +accels, no decels   Amery: q2-3 minutes   SVE: 3/50/-3  Bedside sono: cephalic

## 2020-09-30 NOTE — OB RN DELIVERY SUMMARY - NS_GENERALBABYACOMMENTA_OBGYN_ALL_OB_FT
infant needed to be taken to crib as per neonatologist due to meconium stained fluid, infant then went skin to skin infant needed to be taken to crib as per neonatologist due to meconium stained fluid, infant then went skin to skin.

## 2020-09-30 NOTE — OB PROVIDER H&P - ASSESSMENT
25 y.o.  at 40 weeks 4 days gestation who presents to L&D for scheduled elective IOL.  - Admit to L&D   - Consent   - Admission labs  - IV fluids  - Continuous toco and fetal monitoring  - GBS neg  - 3/50/-3 on exam, expectant management for now, will reassess need for augmentation with Pitocin in 2-3 hours    Dr. Silva present at bedside

## 2020-09-30 NOTE — OB PROVIDER H&P - NS_PRENATALLABSOURCEGBS36PN_OBGYN_ALL_OB

## 2020-10-01 ENCOUNTER — TRANSCRIPTION ENCOUNTER (OUTPATIENT)
Age: 25
End: 2020-10-01

## 2020-10-01 LAB
RUBV IGG SER-ACNC: 2.1 INDEX — SIGNIFICANT CHANGE UP
RUBV IGG SER-IMP: POSITIVE — SIGNIFICANT CHANGE UP
T PALLIDUM AB TITR SER: NEGATIVE — SIGNIFICANT CHANGE UP

## 2020-10-01 PROCEDURE — 59409 OBSTETRICAL CARE: CPT | Mod: U9,GC

## 2020-10-01 RX ORDER — HYDROCORTISONE 1 %
1 OINTMENT (GRAM) TOPICAL EVERY 6 HOURS
Refills: 0 | Status: DISCONTINUED | OUTPATIENT
Start: 2020-10-01 | End: 2020-10-03

## 2020-10-01 RX ORDER — OXYCODONE HYDROCHLORIDE 5 MG/1
5 TABLET ORAL
Refills: 0 | Status: DISCONTINUED | OUTPATIENT
Start: 2020-10-01 | End: 2020-10-03

## 2020-10-01 RX ORDER — SODIUM CHLORIDE 9 MG/ML
1000 INJECTION, SOLUTION INTRAVENOUS
Refills: 0 | Status: DISCONTINUED | OUTPATIENT
Start: 2020-10-01 | End: 2020-10-03

## 2020-10-01 RX ORDER — IBUPROFEN 200 MG
600 TABLET ORAL EVERY 6 HOURS
Refills: 0 | Status: COMPLETED | OUTPATIENT
Start: 2020-10-01 | End: 2021-08-30

## 2020-10-01 RX ORDER — MAGNESIUM HYDROXIDE 400 MG/1
30 TABLET, CHEWABLE ORAL
Refills: 0 | Status: DISCONTINUED | OUTPATIENT
Start: 2020-10-01 | End: 2020-10-03

## 2020-10-01 RX ORDER — BENZOCAINE 10 %
1 GEL (GRAM) MUCOUS MEMBRANE EVERY 6 HOURS
Refills: 0 | Status: DISCONTINUED | OUTPATIENT
Start: 2020-10-01 | End: 2020-10-03

## 2020-10-01 RX ORDER — SIMETHICONE 80 MG/1
80 TABLET, CHEWABLE ORAL EVERY 4 HOURS
Refills: 0 | Status: DISCONTINUED | OUTPATIENT
Start: 2020-10-01 | End: 2020-10-03

## 2020-10-01 RX ORDER — DIBUCAINE 1 %
1 OINTMENT (GRAM) RECTAL EVERY 6 HOURS
Refills: 0 | Status: DISCONTINUED | OUTPATIENT
Start: 2020-10-01 | End: 2020-10-03

## 2020-10-01 RX ORDER — SODIUM CHLORIDE 9 MG/ML
3 INJECTION INTRAMUSCULAR; INTRAVENOUS; SUBCUTANEOUS EVERY 8 HOURS
Refills: 0 | Status: DISCONTINUED | OUTPATIENT
Start: 2020-10-01 | End: 2020-10-03

## 2020-10-01 RX ORDER — OXYTOCIN 10 UNIT/ML
333.33 VIAL (ML) INJECTION
Qty: 20 | Refills: 0 | Status: DISCONTINUED | OUTPATIENT
Start: 2020-10-01 | End: 2020-10-03

## 2020-10-01 RX ORDER — OXYCODONE HYDROCHLORIDE 5 MG/1
5 TABLET ORAL ONCE
Refills: 0 | Status: DISCONTINUED | OUTPATIENT
Start: 2020-10-01 | End: 2020-10-03

## 2020-10-01 RX ORDER — DIPHENHYDRAMINE HCL 50 MG
25 CAPSULE ORAL EVERY 6 HOURS
Refills: 0 | Status: DISCONTINUED | OUTPATIENT
Start: 2020-10-01 | End: 2020-10-03

## 2020-10-01 RX ORDER — LANOLIN
1 OINTMENT (GRAM) TOPICAL EVERY 6 HOURS
Refills: 0 | Status: DISCONTINUED | OUTPATIENT
Start: 2020-10-01 | End: 2020-10-03

## 2020-10-01 RX ORDER — KETOROLAC TROMETHAMINE 30 MG/ML
30 SYRINGE (ML) INJECTION ONCE
Refills: 0 | Status: DISCONTINUED | OUTPATIENT
Start: 2020-10-01 | End: 2020-10-01

## 2020-10-01 RX ORDER — ACETAMINOPHEN 500 MG
975 TABLET ORAL
Refills: 0 | Status: DISCONTINUED | OUTPATIENT
Start: 2020-10-01 | End: 2020-10-03

## 2020-10-01 RX ORDER — PRAMOXINE HYDROCHLORIDE 150 MG/15G
1 AEROSOL, FOAM RECTAL EVERY 4 HOURS
Refills: 0 | Status: DISCONTINUED | OUTPATIENT
Start: 2020-10-01 | End: 2020-10-03

## 2020-10-01 RX ORDER — AER TRAVELER 0.5 G/1
1 SOLUTION RECTAL; TOPICAL EVERY 4 HOURS
Refills: 0 | Status: DISCONTINUED | OUTPATIENT
Start: 2020-10-01 | End: 2020-10-03

## 2020-10-01 RX ORDER — IBUPROFEN 200 MG
600 TABLET ORAL EVERY 6 HOURS
Refills: 0 | Status: DISCONTINUED | OUTPATIENT
Start: 2020-10-01 | End: 2020-10-03

## 2020-10-01 RX ORDER — TETANUS TOXOID, REDUCED DIPHTHERIA TOXOID AND ACELLULAR PERTUSSIS VACCINE, ADSORBED 5; 2.5; 8; 8; 2.5 [IU]/.5ML; [IU]/.5ML; UG/.5ML; UG/.5ML; UG/.5ML
0.5 SUSPENSION INTRAMUSCULAR ONCE
Refills: 0 | Status: COMPLETED | OUTPATIENT
Start: 2020-10-01

## 2020-10-01 RX ORDER — SODIUM CHLORIDE 9 MG/ML
1000 INJECTION, SOLUTION INTRAVENOUS ONCE
Refills: 0 | Status: DISCONTINUED | OUTPATIENT
Start: 2020-10-01 | End: 2020-10-03

## 2020-10-01 RX ADMIN — Medication 600 MILLIGRAM(S): at 23:27

## 2020-10-01 RX ADMIN — Medication 975 MILLIGRAM(S): at 21:29

## 2020-10-01 RX ADMIN — SODIUM CHLORIDE 125 MILLILITER(S): 9 INJECTION, SOLUTION INTRAVENOUS at 07:30

## 2020-10-01 RX ADMIN — Medication 975 MILLIGRAM(S): at 16:14

## 2020-10-01 RX ADMIN — SODIUM CHLORIDE 3 MILLILITER(S): 9 INJECTION INTRAMUSCULAR; INTRAVENOUS; SUBCUTANEOUS at 14:16

## 2020-10-01 RX ADMIN — Medication 1000 MILLIUNIT(S)/MIN: at 09:00

## 2020-10-01 RX ADMIN — Medication 30 MILLIGRAM(S): at 10:55

## 2020-10-01 RX ADMIN — Medication 975 MILLIGRAM(S): at 20:29

## 2020-10-01 RX ADMIN — Medication 975 MILLIGRAM(S): at 15:21

## 2020-10-01 NOTE — OB PROVIDER DELIVERY SUMMARY - NSPROVIDERDELIVERYNOTE_OBGYN_ALL_OB_FT
She pushed effectively for 60 minutes, and delivered a viable male infant at 0859. Vertex delivered without difficulty, loose nuchal cord x 1 noted, both shoulders then delivered without difficulty.  Cord was noted to be around the left leg. Cord clamping was clamped after 10 seconds and baby was given to the awaiting pediatricians. Cord segment was clamped and cut for cord gas and blood collection. Placenta delivered spontaneously and intact at 0902. Pitocin started. Uterus, cervix, perineum and vagina were inspected and a 2nd laceration was noted and repaired with 2.0 chromic and 3.0 vicryl. Excellent hemostasis was achieved. Apgars 9,9, . She pushed effectively for 60 minutes, and delivered a viable male infant at 0859. Vertex delivered without difficulty, loose nuchal cord x 1 noted, both shoulders then delivered without difficulty.  Cord was noted to be around the left leg. Cord clamping was clamped after 10 seconds and baby was given to the awaiting pediatricians. Cord segment was clamped and cut for cord gas and blood collection. Placenta delivered spontaneously and intact at 0902. Pitocin started. Uterus, cervix, perineum and vagina were inspected and a 2nd laceration was noted and repaired with 2.0 chromic and 3.0 vicryl. Excellent hemostasis was achieved. Apgars 9,9, .    OB attending:  PGY1 note reviewed, I was present for delivery and repair.  Uncomplicated vaginal delivery  Tayler Pena MD

## 2020-10-01 NOTE — CHART NOTE - NSCHARTNOTEFT_GEN_A_CORE
Patient seen and examined at bedside  SVE: 7/80/-2  FH: intermittent cat I + intermittent cat II which resolves with repositioning. after respositioning to high-swartz position, patient moderate varability, accels, no decels  Maben: irregular pattern but 4ctx per 10min  expectant management

## 2020-10-01 NOTE — OB NEONATOLOGY/PEDIATRICIAN DELIVERY SUMMARY - NSPEDSNEONOTESA_OBGYN_ALL_OB_FT
Requested by Dr. Pena to attend this Vaginal delivery due to  NRFHT of a  y/o G P mom at  weeks GA.  She had + PNC, is  positive, HIV negative, HBsAg negative, GBS negative, Rubella Imm, RPR NR. Maternal history pertinent for.  L&D: AROM at delivery.  Baby born vertex with good cry, transferred to warmer, orally suctioned, dried, and examined.  Baby showed to father and then transferred to mom for STS.  BW:  g  A:   week AGA female with hx of maternal  P: Transition to Regular Nursery under PMD/ Hospitalist care. Requested by Dr. Pena to attend this Vaginal delivery due to  NRFHT of a25  y/o G1 P 0 mom at 40.5 weeks GA.  She had + PNC, is O positive, HIV negative, HBsAg negative, GBS negative, Rubella and RPR pending not available at time of delivery. Maternal historynot pertinent.  L&D:  Baby born vertex  meconium stain fluids , good cry, transferred to warmer, orally suctioned, dried, and examined.  Baby showed to father and then transferred to mom for STS.  BW: 2930 g  A: 40.5  week AGA male ,mother COVID-19 negative  P: Transition to Regular Nursery under PMD/ Hospitalist care.

## 2020-10-01 NOTE — DISCHARGE NOTE OB - PATIENT PORTAL LINK FT
You can access the FollowMyHealth Patient Portal offered by [facility] by registering at the following website: [email or web address]. By joining [employer]’s FollowMyHealth portal, you will also be able to view your health information using other applications (apps) compatible with our system.

## 2020-10-01 NOTE — OB PROVIDER LABOR PROGRESS NOTE - NS_OBIHIFHRDETAILS_OBGYN_ALL_OB_FT
120 baseline, moderate variability, +accels, no decels
130 baseline, moderate variability, +accels, no decels
cat 2 FHT

## 2020-10-01 NOTE — OB PROVIDER LABOR PROGRESS NOTE - NS_SUBJECTIVE/OBJECTIVE_OBGYN_ALL_OB_FT
The pt is a 24 y/o  at 40w2d in early labor. She is comfortable with epidural.
The pt is a 26 y/o  at 40w2d in labor. She is resting comfortably in bed. No complaints at this time.
Patient is feeling rectal pressure with contractions. She feels good pain relief with her peidural

## 2020-10-01 NOTE — OB PROVIDER LABOR PROGRESS NOTE - ASSESSMENT
Pt is a 25 y.o.  at 40 weeks 3 days gestation admitted in labor with an epidural. Cat 2 FHT.     -continuous toco and fetal heart monitoring  -continue expectant management    Discussed with Dr. Silva
The pt is a 26 y/o  at 40w2d in early labor.  - VSS  - Cat 1 tracing  - Continue expectant management, will reassess in 3 hours     Discussed with Dr. Silva   
The pt is a 26 y/o  at 40w2d in labor.  - VSS  - Cat 1 tracing   - Continue with expectant management    Discussed with Dr. Silva

## 2020-10-02 LAB
HCT VFR BLD CALC: 30.3 % — LOW (ref 34.5–45)
HGB BLD-MCNC: 9.7 G/DL — LOW (ref 11.5–15.5)

## 2020-10-02 RX ORDER — INFLUENZA VIRUS VACCINE 15; 15; 15; 15 UG/.5ML; UG/.5ML; UG/.5ML; UG/.5ML
0.5 SUSPENSION INTRAMUSCULAR ONCE
Refills: 0 | Status: COMPLETED | OUTPATIENT
Start: 2020-10-02 | End: 2020-10-02

## 2020-10-02 RX ORDER — IBUPROFEN 200 MG
1 TABLET ORAL
Qty: 28 | Refills: 0
Start: 2020-10-02 | End: 2020-10-08

## 2020-10-02 RX ADMIN — Medication 600 MILLIGRAM(S): at 06:00

## 2020-10-02 RX ADMIN — SODIUM CHLORIDE 3 MILLILITER(S): 9 INJECTION INTRAMUSCULAR; INTRAVENOUS; SUBCUTANEOUS at 00:24

## 2020-10-02 RX ADMIN — Medication 600 MILLIGRAM(S): at 00:26

## 2020-10-02 RX ADMIN — Medication 975 MILLIGRAM(S): at 22:08

## 2020-10-02 RX ADMIN — Medication 600 MILLIGRAM(S): at 12:17

## 2020-10-02 RX ADMIN — Medication 600 MILLIGRAM(S): at 17:54

## 2020-10-02 RX ADMIN — Medication 600 MILLIGRAM(S): at 06:40

## 2020-10-02 RX ADMIN — SODIUM CHLORIDE 3 MILLILITER(S): 9 INJECTION INTRAMUSCULAR; INTRAVENOUS; SUBCUTANEOUS at 06:01

## 2020-10-02 RX ADMIN — Medication 600 MILLIGRAM(S): at 18:39

## 2020-10-02 RX ADMIN — INFLUENZA VIRUS VACCINE 0.5 MILLILITER(S): 15; 15; 15; 15 SUSPENSION INTRAMUSCULAR at 11:27

## 2020-10-02 RX ADMIN — Medication 975 MILLIGRAM(S): at 21:08

## 2020-10-02 RX ADMIN — Medication 975 MILLIGRAM(S): at 10:00

## 2020-10-02 RX ADMIN — Medication 600 MILLIGRAM(S): at 23:29

## 2020-10-02 RX ADMIN — Medication 975 MILLIGRAM(S): at 03:45

## 2020-10-02 RX ADMIN — Medication 975 MILLIGRAM(S): at 02:45

## 2020-10-02 RX ADMIN — Medication 975 MILLIGRAM(S): at 15:04

## 2020-10-02 RX ADMIN — Medication 600 MILLIGRAM(S): at 13:00

## 2020-10-02 RX ADMIN — Medication 975 MILLIGRAM(S): at 09:00

## 2020-10-02 RX ADMIN — Medication 975 MILLIGRAM(S): at 16:00

## 2020-10-02 NOTE — PROGRESS NOTE ADULT - ATTENDING COMMENTS
I have seen and examined the pt at the bedside. Uterus well contrated, 2FB under umbilicus. Pt wants circ, I will perform

## 2020-10-03 VITALS
HEART RATE: 72 BPM | OXYGEN SATURATION: 100 % | SYSTOLIC BLOOD PRESSURE: 124 MMHG | RESPIRATION RATE: 18 BRPM | TEMPERATURE: 98 F | DIASTOLIC BLOOD PRESSURE: 82 MMHG

## 2020-10-03 PROCEDURE — 86900 BLOOD TYPING SEROLOGIC ABO: CPT

## 2020-10-03 PROCEDURE — 85025 COMPLETE CBC W/AUTO DIFF WBC: CPT

## 2020-10-03 PROCEDURE — 90686 IIV4 VACC NO PRSV 0.5 ML IM: CPT

## 2020-10-03 PROCEDURE — 90715 TDAP VACCINE 7 YRS/> IM: CPT

## 2020-10-03 PROCEDURE — 86762 RUBELLA ANTIBODY: CPT

## 2020-10-03 PROCEDURE — 86780 TREPONEMA PALLIDUM: CPT

## 2020-10-03 PROCEDURE — 59050 FETAL MONITOR W/REPORT: CPT

## 2020-10-03 PROCEDURE — 36415 COLL VENOUS BLD VENIPUNCTURE: CPT

## 2020-10-03 PROCEDURE — 59025 FETAL NON-STRESS TEST: CPT

## 2020-10-03 PROCEDURE — 86850 RBC ANTIBODY SCREEN: CPT

## 2020-10-03 PROCEDURE — G0463: CPT

## 2020-10-03 PROCEDURE — 86901 BLOOD TYPING SEROLOGIC RH(D): CPT

## 2020-10-03 PROCEDURE — 85014 HEMATOCRIT: CPT

## 2020-10-03 PROCEDURE — 85018 HEMOGLOBIN: CPT

## 2020-10-03 RX ORDER — TETANUS TOXOID, REDUCED DIPHTHERIA TOXOID AND ACELLULAR PERTUSSIS VACCINE, ADSORBED 5; 2.5; 8; 8; 2.5 [IU]/.5ML; [IU]/.5ML; UG/.5ML; UG/.5ML; UG/.5ML
0.5 SUSPENSION INTRAMUSCULAR ONCE
Refills: 0 | Status: COMPLETED | OUTPATIENT
Start: 2020-10-03 | End: 2020-10-03

## 2020-10-03 RX ADMIN — TETANUS TOXOID, REDUCED DIPHTHERIA TOXOID AND ACELLULAR PERTUSSIS VACCINE, ADSORBED 0.5 MILLILITER(S): 5; 2.5; 8; 8; 2.5 SUSPENSION INTRAMUSCULAR at 05:28

## 2020-10-03 RX ADMIN — Medication 975 MILLIGRAM(S): at 04:20

## 2020-10-03 RX ADMIN — Medication 975 MILLIGRAM(S): at 03:20

## 2020-10-03 RX ADMIN — Medication 975 MILLIGRAM(S): at 08:47

## 2020-10-03 RX ADMIN — Medication 600 MILLIGRAM(S): at 05:28

## 2020-10-03 RX ADMIN — Medication 975 MILLIGRAM(S): at 09:30

## 2020-10-03 RX ADMIN — Medication 600 MILLIGRAM(S): at 06:28

## 2020-10-03 RX ADMIN — Medication 600 MILLIGRAM(S): at 00:29

## 2020-10-03 NOTE — PROGRESS NOTE ADULT - ASSESSMENT
A/P:  Patient is a 24yo  now PPD#2 s/p spontaneous vaginal delivery at 40 weeks 3 days gestation.  Rubella immune, O+    -Stable  -Voiding, tolerating PO, bowel function nml   -Advance care as tolerated   -Continue routine postpartum care and education.  -Healthy male infant s/p circumcision
A/P:  Patient is a 26yo  now PPD#1 s/p spontaneous vaginal delivery at 40 weeks 3 days gestation.  -Stable  -Voiding, tolerating PO, bowel function nml   -Advance care as tolerated   -Continue routine postpartum care and education.  -Healthy male infant, desires circumcision.

## 2020-10-03 NOTE — PROGRESS NOTE ADULT - SUBJECTIVE AND OBJECTIVE BOX
BERENICE CACERES is a 24yo  now PPD#2 s/p spontaneous vaginal delivery at 40 weeks 3 days gestation.    S:    The patient has no complaints.  Pain controlled with current medications.   She is ambulating without difficulty and tolerating PO   + flatus/-BM/+ voiding     O:    T(C): 36.7 (10-02-20 @ 04:37), Max: 37.9 (10-01-20 @ 09:33)  HR: 69 (10-02-20 @ 04:37) (69 - 116)  BP: 100/70 (10-02-20 @ 04:37) (94/65 - 133/88)  RR: 18 (10-02-20 @ 04:37) (14 - 18)  SpO2: 99% (10-02-20 @ 04:37) (71% - 100%)    Gen: NAD, AOx3  CV: RRR  Pulm: CTAB  Breast: nontender, non-engorged   Abdomen:  soft, non-tender, non-distended, +bowel sounds.  Uterus:  Fundus firm below umbilicus  VE:  +lochia  Ext:  Non-tender.                          11.8   11.63 )-----------( 320      ( 30 Sep 2020 19:19 )             35.9               
BERENICE CACERES is a 26yo  now PPD#1 s/p spontaneous vaginal delivery at 40 weeks 3 days gestation.    S:    The patient has no complaints.  Pain controlled with current medications.   She is ambulating without difficulty and tolerating PO   + flatus/-BM/+ voiding     O:    T(C): 36.7 (10-02-20 @ 04:37), Max: 37.9 (10-01-20 @ 09:33)  HR: 69 (10-02-20 @ 04:37) (69 - 116)  BP: 100/70 (10-02-20 @ 04:37) (94/65 - 133/88)  RR: 18 (10-02-20 @ 04:37) (14 - 18)  SpO2: 99% (10-02-20 @ 04:37) (71% - 100%)    Gen: NAD, AOx3  CV: RRR  Pulm: CTAB  Breast: nontender, non-engorged   Abdomen:  soft, non-tender, non-distended, +bowel sounds.  Uterus:  Fundus firm below umbilicus  VE:  +lochia  Ext:  Non-tender.                          11.8   11.63 )-----------( 320      ( 30 Sep 2020 19:19 )             35.9

## 2020-10-05 LAB — MEV IGM SER-ACNC: <20 AU/ML — SIGNIFICANT CHANGE UP (ref 0–19.9)

## 2020-11-07 ENCOUNTER — EMERGENCY (EMERGENCY)
Facility: HOSPITAL | Age: 25
LOS: 1 days | Discharge: HOME | End: 2020-11-07
Attending: EMERGENCY MEDICINE
Payer: COMMERCIAL

## 2020-11-07 VITALS
RESPIRATION RATE: 18 BRPM | SYSTOLIC BLOOD PRESSURE: 111 MMHG | DIASTOLIC BLOOD PRESSURE: 79 MMHG | TEMPERATURE: 98 F | WEIGHT: 164.02 LBS | OXYGEN SATURATION: 98 % | HEART RATE: 84 BPM | HEIGHT: 67 IN

## 2020-11-07 LAB
ALBUMIN SERPL ELPH-MCNC: 3.9 G/DL — SIGNIFICANT CHANGE UP (ref 3.3–5.2)
ALP SERPL-CCNC: 97 U/L — SIGNIFICANT CHANGE UP (ref 40–120)
ALT FLD-CCNC: 11 U/L — SIGNIFICANT CHANGE UP
ANION GAP SERPL CALC-SCNC: 11 MMOL/L — SIGNIFICANT CHANGE UP (ref 5–17)
APPEARANCE UR: ABNORMAL
APTT BLD: 31.6 SEC — SIGNIFICANT CHANGE UP (ref 27.5–35.5)
AST SERPL-CCNC: 19 U/L — SIGNIFICANT CHANGE UP
BACTERIA # UR AUTO: ABNORMAL
BASOPHILS # BLD AUTO: 0.02 K/UL — SIGNIFICANT CHANGE UP (ref 0–0.2)
BASOPHILS NFR BLD AUTO: 0.4 % — SIGNIFICANT CHANGE UP (ref 0–2)
BILIRUB SERPL-MCNC: 0.3 MG/DL — LOW (ref 0.4–2)
BILIRUB UR-MCNC: NEGATIVE — SIGNIFICANT CHANGE UP
BLD GP AB SCN SERPL QL: SIGNIFICANT CHANGE UP
BUN SERPL-MCNC: 9 MG/DL — SIGNIFICANT CHANGE UP (ref 8–20)
CALCIUM SERPL-MCNC: 9 MG/DL — SIGNIFICANT CHANGE UP (ref 8.6–10.2)
CHLORIDE SERPL-SCNC: 105 MMOL/L — SIGNIFICANT CHANGE UP (ref 98–107)
CO2 SERPL-SCNC: 23 MMOL/L — SIGNIFICANT CHANGE UP (ref 22–29)
COLOR SPEC: ABNORMAL
COMMENT - URINE: SIGNIFICANT CHANGE UP
CREAT SERPL-MCNC: 0.72 MG/DL — SIGNIFICANT CHANGE UP (ref 0.5–1.3)
DIFF PNL FLD: ABNORMAL
EOSINOPHIL # BLD AUTO: 0.13 K/UL — SIGNIFICANT CHANGE UP (ref 0–0.5)
EOSINOPHIL NFR BLD AUTO: 2.4 % — SIGNIFICANT CHANGE UP (ref 0–6)
EPI CELLS # UR: SIGNIFICANT CHANGE UP
GLUCOSE SERPL-MCNC: 88 MG/DL — SIGNIFICANT CHANGE UP (ref 70–99)
GLUCOSE UR QL: NEGATIVE — SIGNIFICANT CHANGE UP
HCG SERPL-ACNC: <4 MIU/ML — SIGNIFICANT CHANGE UP
HCT VFR BLD CALC: 37.3 % — SIGNIFICANT CHANGE UP (ref 34.5–45)
HGB BLD-MCNC: 12 G/DL — SIGNIFICANT CHANGE UP (ref 11.5–15.5)
IMM GRANULOCYTES NFR BLD AUTO: 0.2 % — SIGNIFICANT CHANGE UP (ref 0–1.5)
INR BLD: 1.06 RATIO — SIGNIFICANT CHANGE UP (ref 0.88–1.16)
KETONES UR-MCNC: ABNORMAL
LEUKOCYTE ESTERASE UR-ACNC: ABNORMAL
LYMPHOCYTES # BLD AUTO: 2.77 K/UL — SIGNIFICANT CHANGE UP (ref 1–3.3)
LYMPHOCYTES # BLD AUTO: 51.2 % — HIGH (ref 13–44)
MCHC RBC-ENTMCNC: 29.4 PG — SIGNIFICANT CHANGE UP (ref 27–34)
MCHC RBC-ENTMCNC: 32.2 GM/DL — SIGNIFICANT CHANGE UP (ref 32–36)
MCV RBC AUTO: 91.4 FL — SIGNIFICANT CHANGE UP (ref 80–100)
MONOCYTES # BLD AUTO: 0.52 K/UL — SIGNIFICANT CHANGE UP (ref 0–0.9)
MONOCYTES NFR BLD AUTO: 9.6 % — SIGNIFICANT CHANGE UP (ref 2–14)
NEUTROPHILS # BLD AUTO: 1.96 K/UL — SIGNIFICANT CHANGE UP (ref 1.8–7.4)
NEUTROPHILS NFR BLD AUTO: 36.2 % — LOW (ref 43–77)
NITRITE UR-MCNC: POSITIVE
PH UR: 5 — SIGNIFICANT CHANGE UP (ref 5–8)
PLATELET # BLD AUTO: 229 K/UL — SIGNIFICANT CHANGE UP (ref 150–400)
POTASSIUM SERPL-MCNC: 4 MMOL/L — SIGNIFICANT CHANGE UP (ref 3.5–5.3)
POTASSIUM SERPL-SCNC: 4 MMOL/L — SIGNIFICANT CHANGE UP (ref 3.5–5.3)
PROT SERPL-MCNC: 6.6 G/DL — SIGNIFICANT CHANGE UP (ref 6.6–8.7)
PROT UR-MCNC: 30 MG/DL
PROTHROM AB SERPL-ACNC: 12.3 SEC — SIGNIFICANT CHANGE UP (ref 10.6–13.6)
RBC # BLD: 4.08 M/UL — SIGNIFICANT CHANGE UP (ref 3.8–5.2)
RBC # FLD: 14 % — SIGNIFICANT CHANGE UP (ref 10.3–14.5)
RBC CASTS # UR COMP ASSIST: >50 /HPF (ref 0–4)
SODIUM SERPL-SCNC: 139 MMOL/L — SIGNIFICANT CHANGE UP (ref 135–145)
SP GR SPEC: 1.02 — SIGNIFICANT CHANGE UP (ref 1.01–1.02)
UROBILINOGEN FLD QL: NEGATIVE — SIGNIFICANT CHANGE UP
WBC # BLD: 5.41 K/UL — SIGNIFICANT CHANGE UP (ref 3.8–10.5)
WBC # FLD AUTO: 5.41 K/UL — SIGNIFICANT CHANGE UP (ref 3.8–10.5)
WBC UR QL: SIGNIFICANT CHANGE UP

## 2020-11-07 PROCEDURE — 80053 COMPREHEN METABOLIC PANEL: CPT

## 2020-11-07 PROCEDURE — 86850 RBC ANTIBODY SCREEN: CPT

## 2020-11-07 PROCEDURE — 76856 US EXAM PELVIC COMPLETE: CPT

## 2020-11-07 PROCEDURE — 85610 PROTHROMBIN TIME: CPT

## 2020-11-07 PROCEDURE — 86901 BLOOD TYPING SEROLOGIC RH(D): CPT

## 2020-11-07 PROCEDURE — 85730 THROMBOPLASTIN TIME PARTIAL: CPT

## 2020-11-07 PROCEDURE — 99284 EMERGENCY DEPT VISIT MOD MDM: CPT | Mod: 25

## 2020-11-07 PROCEDURE — 84702 CHORIONIC GONADOTROPIN TEST: CPT

## 2020-11-07 PROCEDURE — 36415 COLL VENOUS BLD VENIPUNCTURE: CPT

## 2020-11-07 PROCEDURE — 87086 URINE CULTURE/COLONY COUNT: CPT

## 2020-11-07 PROCEDURE — 76856 US EXAM PELVIC COMPLETE: CPT | Mod: 26

## 2020-11-07 PROCEDURE — 99285 EMERGENCY DEPT VISIT HI MDM: CPT

## 2020-11-07 PROCEDURE — 86900 BLOOD TYPING SEROLOGIC ABO: CPT

## 2020-11-07 PROCEDURE — 81001 URINALYSIS AUTO W/SCOPE: CPT

## 2020-11-07 PROCEDURE — 85025 COMPLETE CBC W/AUTO DIFF WBC: CPT

## 2020-11-07 RX ORDER — SODIUM CHLORIDE 9 MG/ML
1000 INJECTION INTRAMUSCULAR; INTRAVENOUS; SUBCUTANEOUS ONCE
Refills: 0 | Status: COMPLETED | OUTPATIENT
Start: 2020-11-07 | End: 2020-11-07

## 2020-11-07 RX ADMIN — SODIUM CHLORIDE 1000 MILLILITER(S): 9 INJECTION INTRAMUSCULAR; INTRAVENOUS; SUBCUTANEOUS at 06:04

## 2020-11-07 NOTE — ED PROVIDER NOTE - NSFOLLOWUPINSTRUCTIONS_ED_ALL_ED_FT
pelvic rest  follow up with gyn in 5 - 7 days  return to ed with  worse bleeding, dizziness , shortness of breath

## 2020-11-07 NOTE — ED ADULT NURSE REASSESSMENT NOTE - NS ED NURSE REASSESS COMMENT FT1
pt a&ox4 c/o vaginal bleeding x1 day with associated dizziness and lightheadedness, no episodes of syncope.  Pt reports soaking 2 pads in one hour, "with clots bigger then quarters".  Pt is post partum one month.  denies pain, SOB, dysuria, fever and chills.  POC explained, pt verbalized understanding.  Safety maintained.  Family at bedside.

## 2020-11-07 NOTE — CONSULT NOTE ADULT - SUBJECTIVE AND OBJECTIVE BOX
26yo  PPD#37 s/p  26yo  PPD#37 s/p uncomplicated  who presents to the ED with vaginal bleeding since 0100. Vaginal bleeding began at 0100, is bright red in color and associated with passage of dark red clots. She has been using 2 pads per hour. She is formula feeding, ambulating without difficulty, tolerating PO, voiding spontaneously, +flatus/+BM. She denies fevers, chills, nausea, vomiting, urinary symptoms, lightheadedness, dizziness, palpitations, shortness of breath and chest pain. No other complaints at this time.     POB: 10/1/20, full term, uncomplicated , 6lbs 7oz  PGYN: -fibroids, -cysts, denies STD hx, denies abnormal PAP smears   PMH: Denies  PSH: Denies  SH: Denies EtOH, tobacco and illicit drug use   Meds: Denies  All: NKDA    Vital Signs Last 24 Hrs  T(C): 36.7 (2020 04:57), Max: 36.7 (2020 04:57)  T(F): 98 (2020 04:57), Max: 98 (2020 04:57)  HR: 84 (2020 04:57) (84 - 84)  BP: 111/79 (2020 04:57) (111/79 - 111/79)  RR: 18 (2020 04:57) (18 - 18)  SpO2: 98% (2020 04:57) (98% - 98%)    General: NAD, well-appearing  Heart: RRR  Lungs: CTAB  Abdominal: soft, non-tender, non-distended, no reboud or guarding  Uterus: Firm, non-tender and below the umbilicus   Ext: non-tender, non-edematous   SSE: cervix visualized, closed and without any purulence of discharge, minimal dark red blood in the vaginal vault   SVE: 0/0/-3, no CMT                         12.0   5.41  )-----------( 229      ( 2020 06:09 )             37.3     11-07    139  |  105  |  9.0  ----------------------------<  88  4.0   |  23.0  |  0.72    Ca    9.0      2020 06:09    TPro  6.6  /  Alb  3.9  /  TBili  0.3<L>  /  DBili  x   /  AST  19  /  ALT  11  /  AlkPhos  97      US Pelvis Complete (US Pelvis Complete .) (20 @ 06:55)   FINDINGS:  UTERUS: The uterus measures 10.7 x 6 x 8.1 cm. There is no myometrial mass. The endometrium is of normal thickness, measuring 8 mm.  RIGHT OVARY:Morphologically normal. Measuresl 3.8 x 2.1 x 1.8 cm. Normal blood flow and waveforms are demonstrated.  LEFT OVARY: Morphologically normal. Measures 3.5 x 1.6 x 2.5 cm. Normal blood flow and waveforms are demonstrated.  FLUID: There is small volume pelvic free fluid.    IMPRESSION:  Unremarkable pelvic ultrasound.

## 2020-11-07 NOTE — ED PROVIDER NOTE - PATIENT PORTAL LINK FT
You can access the FollowMyHealth Patient Portal offered by [facility] by registering at the following website: [email or web address]. By joining [email or web address]’s FollowMyHealth portal, you will also be able to view your health information using other applications (apps) compatible with our system.

## 2020-11-07 NOTE — CONSULT NOTE ADULT - ASSESSMENT
24yo  PPD#37 s/p uncomplicated  who is being evaluated for postpartum vaginal bleeding.   -Asymptomatic   -VSS  -PE benign, uterus without tenderness, SSE with minimal dark red blood in the vaginal vault   -Hgb/Hct: .3  -TVUS refused, Transabdominal sono unremarkable    -No medical or surgical management indicated at this time    Postpartum vaginal bleeding likely 2/2 uterine subinvolution. Endometritis and pelvic infection is less likely as patient is afebrile, WBC within normal limits and patient is without uterine tenderness. No retained products of conception or structural abnormalities noted on ultrasound. Uterus is firm therefore no uterotonics are indicated at this time.     Dispo: Patient is stable for discharge to home with outpatient follow up on 11/10/20. Differential diagnoses explained, return precautions given, patient verbalized understanding and agreement with plan.     Discussed with Dr. Crenshaw.    24yo  PPD#37 s/p uncomplicated  who is being evaluated for postpartum vaginal bleeding.   -Asymptomatic   -VSS  -PE benign, uterus without tenderness, SSE with minimal dark red blood in the vaginal vault   -Hgb/Hct: .3  -TVUS refused, Transabdominal sono unremarkable    -No medical or surgical management indicated at this time    Postpartum vaginal bleeding likely 2/2 uterine subinvolution. Endometritis and pelvic infection is less likely as patient is afebrile, WBC within normal limits and patient is without uterine tenderness. No retained products of conception or structural abnormalities noted on ultrasound. Uterus is firm therefore no uterotonics are indicated at this time.     Dispo: Patient is stable for discharge to home with outpatient follow up on 11/10/20. Differential diagnoses explained, return precautions given, patient verbalized understanding and agreement with plan.     Discussed with Dr. Crenshaw.       PGY4 Addendum: Patient seen and examined at bedside. Agree with the above

## 2020-11-07 NOTE — ED ADULT TRIAGE NOTE - CHIEF COMPLAINT QUOTE
patient states that she gave birth last month, yesterday started spotting and today, increased bleeding, denies pain

## 2020-11-07 NOTE — ED PROVIDER NOTE - CLINICAL SUMMARY MEDICAL DECISION MAKING FREE TEXT BOX
Pt with vaginal bleeding x 1 day. Pt states she has used 12 pads in the past 6 hours. VSS. No abdominal pain. Pt w/o evidence of acute distress. Will check labs, pelvic us, OBGYN consulted and will see pt in ED.

## 2020-11-07 NOTE — ED PROVIDER NOTE - OBJECTIVE STATEMENT
Pt is a 24 y/o f, , 1 month post , presents to ED c/o vaginal bleeding x 1 day. Pt states she developed vaginal spotting yesterday morning that has progressively worsened. Pt states she has been passing clots and has used approximately 12 pads within the past 6 hours.  Pt has no other complaints and has had no abdominal pain since onset of symptoms. Pt denies back pain, urinary sx, fever, chills, SOB, CP, palpitations, nausea, vomiting, sick contacts, recent travel.

## 2020-11-07 NOTE — ED PROVIDER NOTE - ATTENDING CONTRIBUTION TO CARE
1 month post  with vaginal bleeding since yesterday, initially spotting now reports 2 pads per hour. Hemodynamically stable, H/H stable, HCG negative. Unclear if retained products, return of menstruation. Will have gyn consult, get pelvic US.

## 2020-11-09 LAB
CULTURE RESULTS: SIGNIFICANT CHANGE UP
SPECIMEN SOURCE: SIGNIFICANT CHANGE UP

## 2021-08-18 NOTE — ED ADULT TRIAGE NOTE - ARRIVAL FROM
Home
patient BIBEMS from home c/o urinary retention x several days-- patient reports she is able to urinate but "only a little bit comes out"--denies fevers/chills/hematuria

## 2021-11-09 ENCOUNTER — RESULT REVIEW (OUTPATIENT)
Age: 26
End: 2021-11-09

## 2021-11-24 NOTE — OB RN PATIENT PROFILE - CONTRAINDICATIONS & PRECAUTIONS (SELECT ALL THAT APPLY)
72-96 hours  Can try to accommodate a 10 am if available on a monday
CBLM to schedule procedure. Please transfer to Brett Vale at ext [phone] for scheduling.
Dr. Balta Reyes is asking how long this Bravo study will be (24, 48, 96 hours, etc). Also, EGD/Bravos can only be done up until 1:00 pm at [address] [place] [address], so finding an open MAC spot asap may be difficult as you are only at [address] [place] [address] every other Friday.     Please advis
Dr. Briana Doherty--    Please provide cpt codes for exact name/coding for surgery you want performed. If inquiring on nissen fundoplication, we'll need to be planned through surgery office given we do not have access to their tax ID and coding for surgery.
Dr. Eden North - is this pt's procedure urgent (EGD w/Bravo)? Your next opening at [address] [place] [address] is 2/18/22. Please advise. Thank you!
Dr. Newton Valentino--    Would you like patient to proceed with scheduling procedure? Unable to inquire on cost/coverage and deductibles unless referral and procedure are submitted to plan.      Attempted to obtain coverage and billing inquires yesterday with no
Dr. Stacey Campoverde in Endo, you have MAC available on 12/13/21 at 9:30 am at [address] [place] [address] for this pt's EGD/Bravo. Please advise if this date & time work for you. Case needs to be entered in by today to guarantee that slot. Routed HP! Thank you!
EGD with BRAVO for esophagitis and pre nissen fundoplication
HOLD placed on 2/18/22.
Had esophagitis on EGD with esophagitis so Bravo may not be needed. Will run by insurance and confirm. Doing bravo when there is severe esophagitis is contraindicated. Other option is repeat EGD and decide at that time if bravo is needed.
Noted and appreciated. Will await call back from patient once she verifies with her insurance for coverage purposes.
Pt returned call. Please call.
Scheduled for:  EGD/BRAVO - [id number]  Provider Name:  Dr. Kristy Castle  Date:  12/13/21  Location:  [facility]  Sedation:  MAC  Time:  9:30 am (pt is aware to arrive at 8:30 am)  Prep:  NPO   Meds/Allergies Reconciled?:  Yes, Physician reviewed      Diagnosis with codes:
Sorry for the confusion, can you please call and find out if having esophagitis is sufficient for reflux for hiatal hernia correction or if needs BRAVO.  Don't think [place] is needed
Yes because pending surgery and further workup
Yes ok to schedule
Yes that works  Thank you
none...

## 2022-01-01 NOTE — ED PROVIDER NOTE - NS HIV RISK FACTOR YES
Declined Arti Carter  (NP)  2022 02:36:43 Macario Hurtado  (RN)  2022 06:02:34 Patrizia Kapadia  (PCA)  2022 01:52:09 Alyssa Martinez  (RN)  2022 02:48:42

## 2022-05-10 NOTE — ED ADULT NURSE NOTE - CHPI ED SYMPTOMS NEG
no fever/no abdominal distension/no diarrhea/no vomiting/no dysuria/no burning urination Statement Selected

## 2022-09-22 NOTE — ED ADULT NURSE NOTE - NSSISCREENINGQ4_ED_A_ED
[FreeTextEntry1] : Referred by Dr. Delaney for numbness in her right hand, worse when holding something or lying down in bed\par Also c/o right middle finger locking at times\par She's been wearing a brace at night with minimal relief\par \par Reviewed:\par Notes from Dr. Castrejon, neurosurgery  No

## 2023-03-24 NOTE — ED PROVIDER NOTE - NS ED MD EM SELECTION
[Potential consequences of obesity discussed] : Potential consequences of obesity discussed [Benefits of weight loss discussed] : Benefits of weight loss discussed [Encouraged to increase physical activity] : Encouraged to increase physical activity [Decrease Portions] : decrease portions [Good understanding] : Patient has a good understanding of disease, goals and obesity follow-up plan [address] Exp Problem Focused - Mod. Complex

## 2023-05-01 NOTE — DISCHARGE NOTE ADULT - PLAN OF CARE
Health Maintenance Due   Topic Date Due   • Hepatitis B Vaccine (For Physician/APC Discussion) (2 of 3 - 19+ 3-dose series) 08/13/2020   • COVID-19 Vaccine (4 - Booster for Moderna series) 02/07/2022   • Traditional Medicare- Medicare Wellness Visit  10/26/2023       Patient is due for topics as listed above but is not proceeding with Immunization(s) COVID-19 and Hep B and MWV (Medicare Wellness Visit) at this time. The patient will schedule MWV   alleviation of pain and symptoms Resume regular diet as tolerated. Follow up with OB GYN as outpatient regarding possible cyst. Follow up with [facility] clinic on thursday of next week for further follow up.  Return to ER if recurrent symptoms that prevent ability to perform ADLs (activities of daily living). Diet as tolerated. Tylenol/Motrin for pain control.

## 2023-10-30 NOTE — ED ADULT TRIAGE NOTE - BP NONINVASIVE DIASTOLIC (MM HG)
64 Azathioprine Pregnancy And Lactation Text: This medication is Pregnancy Category D and isn't considered safe during pregnancy. It is unknown if this medication is excreted in breast milk.

## 2024-05-30 NOTE — ED ADULT NURSE NOTE - BOWEL SOUNDS LUQ
present Number Of Freeze-Thaw Cycles: 1 freeze-thaw cycle Render Note In Bullet Format When Appropriate: No Render Post-Care Instructions In Note?: yes Spray Paint Text: The liquid nitrogen was applied to the skin utilizing a spray paint frosting technique. Medical Necessity Clause: This procedure was medically necessary because the lesions that were treated were: Consent: The patient's consent was obtained including but not limited to risks of crusting, scabbing, blistering, scarring, darker or lighter pigmentary change, recurrence, incomplete removal and infection. Medical Necessity Information: It is in your best interest to select a reason for this procedure from the list below. All of these items fulfill various CMS LCD requirements except the new and changing color options. Post-Care Instructions: I reviewed with the patient in detail post-care instructions. Patient is to wear sunprotection, and avoid picking at any of the treated lesions. Pt may apply Vaseline to crusted or scabbing areas. Duration Of Freeze Thaw-Cycle (Seconds): 3 Detail Level: Detailed Application Tool (Optional): Liquid Nitrogen Sprayer

## 2024-08-22 NOTE — PATIENT PROFILE ADULT. - NS PRO OT REFERRAL QUES 1 YN
Case Management Discharge Planning Note    Patient name Emely Thibodeaux  Location S /S -01 MRN [id number]  : 1969 Date 2024       Current Admission Date: 2024  Current Admission Diagnosis:Right sided weakness   Patient Active Problem List    Diagnosis Date Noted Date Diagnosed    Right sided weakness 2024     Annual physical exam 2024     Gastroesophageal reflux disease without esophagitis 2024     Benzodiazepine use agreement exists 10/13/2022     Insomnia 2021     Vitamin D deficiency 2021     WPW (Pedrito-Parkinson-White syndrome) 2019     Anxiety 2019     Osteoarthritis of carpometacarpal (CMC) joint of thumb 2018     Thyroid nodule 10/31/2017     Thyroid disorder 2015       LOS (days): 0  Geometric Mean LOS (GMLOS) (days):   Days to GMLOS:     OBJECTIVE:            Current admission status: Observation   [id number] [address]  Phone: [phone] Fax: [phone]    Primary Care Provider: ROHIT Suazo    Primary Insurance: [employer]  Secondary Insurance: [employer]    DISCHARGE DETAILS:    Discharge planning discussed with:: patient  Freedom of Choice: Yes  Comments - Freedom of Choice: Per PT/OT - rcmd OPPT/OT. CM f/u with pt at bedside re: same.  Patient aware of same and relayed will attend [facility]. Pt relayed spouse to provide transport home when ready for d/c. No CM d/c needs currently identified, CM remains available.                                                                                                              no

## 2024-11-12 NOTE — ED ADULT NURSE NOTE - PAIN RATING/NUMBER SCALE (0-10): ACTIVITY
Addended by: MOOK HAYES on: 11/12/2024 11:44 AM     Modules accepted: Orders, Level of Service     9

## 2025-01-02 NOTE — DISCHARGE NOTE ADULT - PHYSICIAN SECTION COMPLETE
collapse and severe degenerative changes.      Electronically signed by Livia Mesa          7:38 PM  Upon re-evaluation the patient's symptoms have improved. Pt has non-toxic appearance and condition is stable for discharge. Patient was informed of tests & results, instructed to f/u with PCP and return to the ED upon worsening of symptoms. All questions and concerns were addressed.       Procedures    FINAL IMPRESSION      1. Trichomoniasis of vagina    2.      Avascular necrosis right hip    DISPOSITION/PLAN     DISPOSITION       D/C home  Motrin, percocet, tricinolone cream  F/U orthopedic surgeon  F/U GYN                  DISCHARGE MEDICATIONS:  New Prescriptions    METRONIDAZOLE (FLAGYL) 500 MG TABLET    Take 1 tablet by mouth 3 times daily for 7 days            PATIENT REFERRED TO:  No follow-up provider specified.      (Please note that portions of this note were completed with a voice recognitionprogram.  Efforts were made to edit the dictations but occasionally words are mis-transcribed.)    Gerald Valdez MD(electronically signed)  Attending Emergency Physician           Gerald Valdez MD  01/01/25 [id number]     Yes

## 2025-06-19 NOTE — PATIENT PROFILE ADULT. - AS [facility]
Allergies   Allergen Reactions    Vancomycin SWELLING     \"Ears swelled\"    Tramadol RASH    Aspirin      heartburn    Fentanyl HIVES    Gabapentin Other (See Comments)     \"weight gain and I really don't want Lyrica either\"    Ibuprofen GI UPSET and Cough     Pt stated due to ULCERS    Morphine RASH    Nitrofurantoin Other (See Comments)     Tongue swelling     Call to patient. Left message for pt to call back. Rx's pended.    (4) no limitation